# Patient Record
Sex: MALE | Race: BLACK OR AFRICAN AMERICAN | Employment: FULL TIME | URBAN - METROPOLITAN AREA
[De-identification: names, ages, dates, MRNs, and addresses within clinical notes are randomized per-mention and may not be internally consistent; named-entity substitution may affect disease eponyms.]

---

## 2021-04-06 ENCOUNTER — APPOINTMENT (OUTPATIENT)
Dept: CT IMAGING | Age: 21
End: 2021-04-06

## 2021-04-06 ENCOUNTER — HOSPITAL ENCOUNTER (EMERGENCY)
Age: 21
Discharge: HOME | End: 2021-04-06

## 2021-04-06 VITALS
WEIGHT: 155 LBS | TEMPERATURE: 98.6 F | SYSTOLIC BLOOD PRESSURE: 130 MMHG | RESPIRATION RATE: 16 BRPM | HEART RATE: 77 BPM | DIASTOLIC BLOOD PRESSURE: 79 MMHG | OXYGEN SATURATION: 97 %

## 2021-04-06 DIAGNOSIS — R10.10 UPPER ABDOMINAL PAIN: Primary | ICD-10-CM

## 2021-04-06 LAB
A/G RATIO: 1.1 (ref 1.1–2.2)
ALBUMIN SERPL-MCNC: 4.7 G/DL (ref 3.4–5)
ALP BLD-CCNC: 83 U/L (ref 40–129)
ALT SERPL-CCNC: 10 U/L (ref 10–40)
ANION GAP SERPL CALCULATED.3IONS-SCNC: 13 MMOL/L (ref 3–16)
AST SERPL-CCNC: 17 U/L (ref 15–37)
BASOPHILS ABSOLUTE: 0 K/UL (ref 0–0.2)
BASOPHILS RELATIVE PERCENT: 1 %
BILIRUB SERPL-MCNC: 1.3 MG/DL (ref 0–1)
BILIRUBIN URINE: NEGATIVE
BLOOD, URINE: NEGATIVE
BUN BLDV-MCNC: 7 MG/DL (ref 7–20)
CALCIUM SERPL-MCNC: 10.1 MG/DL (ref 8.3–10.6)
CHLORIDE BLD-SCNC: 102 MMOL/L (ref 99–110)
CLARITY: CLEAR
CO2: 24 MMOL/L (ref 21–32)
COLOR: YELLOW
CREAT SERPL-MCNC: 0.8 MG/DL (ref 0.9–1.3)
EOSINOPHILS ABSOLUTE: 0 K/UL (ref 0–0.6)
EOSINOPHILS RELATIVE PERCENT: 0 %
GFR AFRICAN AMERICAN: >60
GFR NON-AFRICAN AMERICAN: >60
GLOBULIN: 4.1 G/DL
GLUCOSE BLD-MCNC: 97 MG/DL (ref 70–99)
GLUCOSE URINE: NEGATIVE MG/DL
HCT VFR BLD CALC: 48.1 % (ref 40.5–52.5)
HEMOGLOBIN: 15.6 G/DL (ref 13.5–17.5)
KETONES, URINE: NEGATIVE MG/DL
LEUKOCYTE ESTERASE, URINE: NEGATIVE
LIPASE: 17 U/L (ref 13–60)
LYMPHOCYTES ABSOLUTE: 0.8 K/UL (ref 1–5.1)
LYMPHOCYTES RELATIVE PERCENT: 14.9 %
MCH RBC QN AUTO: 29.6 PG (ref 26–34)
MCHC RBC AUTO-ENTMCNC: 32.5 G/DL (ref 31–36)
MCV RBC AUTO: 91.1 FL (ref 80–100)
MICROSCOPIC EXAMINATION: NORMAL
MONOCYTES ABSOLUTE: 0.2 K/UL (ref 0–1.3)
MONOCYTES RELATIVE PERCENT: 4.8 %
NEUTROPHILS ABSOLUTE: 4 K/UL (ref 1.7–7.7)
NEUTROPHILS RELATIVE PERCENT: 79.3 %
NITRITE, URINE: NEGATIVE
PDW BLD-RTO: 13.1 % (ref 12.4–15.4)
PH UA: 7.5 (ref 5–8)
PLATELET # BLD: 177 K/UL (ref 135–450)
PMV BLD AUTO: 8.6 FL (ref 5–10.5)
POTASSIUM REFLEX MAGNESIUM: 3.8 MMOL/L (ref 3.5–5.1)
PROTEIN UA: NEGATIVE MG/DL
RBC # BLD: 5.27 M/UL (ref 4.2–5.9)
SODIUM BLD-SCNC: 139 MMOL/L (ref 136–145)
SPECIFIC GRAVITY UA: >1.03 (ref 1–1.03)
TOTAL PROTEIN: 8.8 G/DL (ref 6.4–8.2)
URINE REFLEX TO CULTURE: NORMAL
URINE TYPE: NORMAL
UROBILINOGEN, URINE: 1 E.U./DL
WBC # BLD: 5.1 K/UL (ref 4–11)

## 2021-04-06 PROCEDURE — 81003 URINALYSIS AUTO W/O SCOPE: CPT

## 2021-04-06 PROCEDURE — 99283 EMERGENCY DEPT VISIT LOW MDM: CPT

## 2021-04-06 PROCEDURE — 83690 ASSAY OF LIPASE: CPT

## 2021-04-06 PROCEDURE — 74177 CT ABD & PELVIS W/CONTRAST: CPT

## 2021-04-06 PROCEDURE — 6370000000 HC RX 637 (ALT 250 FOR IP): Performed by: PHYSICIAN ASSISTANT

## 2021-04-06 PROCEDURE — 36415 COLL VENOUS BLD VENIPUNCTURE: CPT

## 2021-04-06 PROCEDURE — 6360000004 HC RX CONTRAST MEDICATION: Performed by: PHYSICIAN ASSISTANT

## 2021-04-06 PROCEDURE — 85025 COMPLETE CBC W/AUTO DIFF WBC: CPT

## 2021-04-06 PROCEDURE — 80053 COMPREHEN METABOLIC PANEL: CPT

## 2021-04-06 RX ORDER — PANTOPRAZOLE SODIUM 20 MG/1
40 TABLET, DELAYED RELEASE ORAL DAILY
Qty: 30 TABLET | Refills: 0 | Status: ON HOLD | OUTPATIENT
Start: 2021-04-06 | End: 2021-04-09 | Stop reason: HOSPADM

## 2021-04-06 RX ORDER — ONDANSETRON 4 MG/1
8 TABLET, ORALLY DISINTEGRATING ORAL ONCE
Status: COMPLETED | OUTPATIENT
Start: 2021-04-06 | End: 2021-04-06

## 2021-04-06 RX ORDER — PANTOPRAZOLE SODIUM 40 MG/1
40 TABLET, DELAYED RELEASE ORAL ONCE
Status: COMPLETED | OUTPATIENT
Start: 2021-04-06 | End: 2021-04-06

## 2021-04-06 RX ORDER — ONDANSETRON 4 MG/1
4 TABLET, ORALLY DISINTEGRATING ORAL EVERY 8 HOURS PRN
Qty: 20 TABLET | Refills: 0 | Status: ON HOLD | OUTPATIENT
Start: 2021-04-06 | End: 2021-04-09 | Stop reason: HOSPADM

## 2021-04-06 RX ADMIN — IOPAMIDOL 75 ML: 755 INJECTION, SOLUTION INTRAVENOUS at 11:31

## 2021-04-06 RX ADMIN — PANTOPRAZOLE SODIUM 40 MG: 40 TABLET, DELAYED RELEASE ORAL at 14:41

## 2021-04-06 RX ADMIN — ALUMINUM HYDROXIDE, MAGNESIUM HYDROXIDE, AND SIMETHICONE: 200; 200; 20 SUSPENSION ORAL at 09:38

## 2021-04-06 RX ADMIN — ONDANSETRON 8 MG: 4 TABLET, ORALLY DISINTEGRATING ORAL at 09:38

## 2021-04-06 ASSESSMENT — ENCOUNTER SYMPTOMS
VOMITING: 0
BACK PAIN: 0
DIARRHEA: 0
ABDOMINAL PAIN: 1
RESPIRATORY NEGATIVE: 1
NAUSEA: 1
CHEST TIGHTNESS: 0
SHORTNESS OF BREATH: 0
COUGH: 0
COLOR CHANGE: 0
CONSTIPATION: 0

## 2021-04-06 ASSESSMENT — PAIN SCALES - GENERAL: PAINLEVEL_OUTOF10: 6

## 2021-04-06 NOTE — ED PROVIDER NOTES
[address] ENCOUNTER        Pt Name: Petra Dobbs  MRN: [id number]  [place] 2000  Date of evaluation: 4/6/2021  Provider: DOREEN Ferrer  PCP: No primary care provider on file. NANCY. I have evaluated this patient. My supervising physician was available for consultation. CHIEF COMPLAINT       Chief Complaint   Patient presents with    Abdominal Pain     Pt reports abdominal pain that started this morning, states it feels that it is stabbing in nature. HISTORY OF PRESENT ILLNESS   (Location, Timing/Onset, Context/Setting, Quality, Duration, Modifying Factors, Severity, Associated Signs and Symptoms)  Note limiting factors. Petra Dobbs is a 21 y.o. male with no significant past medical history who presents to the ED with complaint of abdominal pain. Woke up this morning with pain to his upper abdomen. Patient states pain is sharp in nature rated 6/10. Apparently had similar pain in the past but unsure what it was due to. States nausea but denies any vomiting. States decreased oral intake due to lack of appetite. Did not eat this morning. Pain worsened with eating and drinking. Patient denies any urinary symptoms or changes in bowel movements. Denies any surgeries to his abdomen. Denies fever chills. Denies chest pain or shortness of breath. Patient states he is not a smoker. Denies any significant history of gastritis/GERD. Denies any history of peptic ulcer disease. Patient denies taking any over-the-counter medication prior to arrival.  Became concerned and came to the ED for further evaluation and treatment. Nursing Notes were all reviewed and agreed with or any disagreements were addressed in the HPI.     REVIEW OF SYSTEMS    (2-9 systems for level 4, 10 or more for level 5)     Review of Systems   Constitutional: Negative for activity change, appetite change, chills, diaphoresis, fatigue discharge. Neck:      Musculoskeletal: Normal range of motion and neck supple. Cardiovascular:      Rate and Rhythm: Normal rate and regular rhythm. Pulses: Normal pulses. Heart sounds: Normal heart sounds. No murmur. No friction rub. No gallop. Pulmonary:      Effort: Pulmonary effort is normal. No respiratory distress. Breath sounds: Normal breath sounds. No stridor. No wheezing, rhonchi or rales. Chest:      Chest wall: No tenderness. Abdominal:      General: Abdomen is flat. Bowel sounds are normal. There is no distension. Palpations: Abdomen is soft. There is no mass. Tenderness: There is abdominal tenderness in the right upper quadrant, epigastric area, periumbilical area and left upper quadrant. There is guarding (voluntary). There is no right CVA tenderness, left CVA tenderness or rebound. Negative signs include Suarez's sign, Rovsing's sign and McBurney's sign. Hernia: No hernia is present. Musculoskeletal: Normal range of motion. Skin:     General: Skin is warm and dry. Coloration: Skin is not pale. Findings: No erythema. Neurological:      Mental Status: He is alert and oriented to person, place, and time.    Psychiatric:         Behavior: Behavior normal.         DIAGNOSTIC RESULTS   LABS:    Labs Reviewed   CBC WITH AUTO DIFFERENTIAL - Abnormal; Notable for the following components:       Result Value    Lymphocytes Absolute 0.8 (*)     All other components within normal limits    Narrative:     Performed at:  [facility] [address] [address] [employer]   Phone [phone]   COMPREHENSIVE METABOLIC PANEL W/ REFLEX TO MG FOR LOW K - Abnormal; Notable for the following components:    CREATININE 0.8 (*)     Total Protein 8.8 (*)     Total Bilirubin 1.3 (*)     All other components within normal limits    Narrative:     Performed at:  [facility] [address] Given 4/6/21 1131)           Patient is a 78-year-old male who presents to the ED implant of upper abdominal pain. Upper abdominal pain he states he woke up with this morning. Patient given GI cocktail with significant improvement of symptoms. Also given Protonix and Zofran. Urinalysis unremarkable. CBC showed normal white count, hemoglobin and platelets. CMP unremarkable. Lipase normal.  CT of the abdomen pelvis showed minimal pelvic free fluid which is nonspecific and may outpatient but no acute etiology identified. There is a normal appendix. Upon repeat examination patient has benign abdomen with improvement of symptoms. Believe patient from upper abdominal pain which could potential be due to gastritis versus GERD at this time. Will start on Protonix and Zofran for home. Patient and informed of the CT showing pelvic free fluid but given reassuring abdominal examination and reassuring work-up here in the ED otherwise believe be safely discharged home with close outpatient follow-up. Will refer to PCP. Given referral to doctors urgent care for 24-hour to 48-hour abdominal exam recheck. Return to ED if any worsening symptoms. Low suspicion for obstruction, peritonitis, perforation, pancreatitis, cholecystitis, appendicitis, diverticulitis, colitis, mesenteric ischemia, AAA, dissection, pyelonephritis, nephrolithiasis, testicular etiology, pyelonephritis, nephrolithiasis or other emergent etiology at this time. FINAL IMPRESSION      1.  Upper abdominal pain          DISPOSITION/PLAN   DISPOSITION Decision To Discharge 04/06/2021 02:58:36 PM      PATIENT REFERREDTO:  [facility]  [address] [place] [address]  [phone]  Go to   As needed, If symptoms worsen    [facility]) Pre-Services  [phone]          DISCHARGE MEDICATIONS:  Discharge Medication List as of 4/6/2021  3:00 PM      START taking these medications    Details   pantoprazole (PROTONIX) 20 MG tablet Take 2 tablets by mouth daily, Disp-30 tablet, R-0Print      ondansetron (ZOFRAN ODT) 4 MG disintegrating tablet Take 1 tablet by mouth every 8 hours as needed for Nausea, Disp-20 tablet, R-0Print             DISCONTINUED MEDICATIONS:  Discharge Medication List as of 4/6/2021  3:00 PM                 (Please note that portions of this note were completed with a voice recognition program.  Efforts were made to edit the dictations but occasionally words are mis-transcribed.)    DOREEN Kwan (electronically signed)         DOREEN Ruiz  04/06/21 4305

## 2021-04-07 ENCOUNTER — HOSPITAL ENCOUNTER (EMERGENCY)
Age: 21
Discharge: PSYCHIATRIC OR SUBSTANCE USE FACILITY | End: 2021-04-07
Attending: EMERGENCY MEDICINE

## 2021-04-07 ENCOUNTER — APPOINTMENT (OUTPATIENT)
Dept: GENERAL RADIOLOGY | Age: 21
End: 2021-04-07

## 2021-04-07 ENCOUNTER — APPOINTMENT (OUTPATIENT)
Dept: CT IMAGING | Age: 21
End: 2021-04-07

## 2021-04-07 ENCOUNTER — HOSPITAL ENCOUNTER (INPATIENT)
Age: 21
LOS: 2 days | Discharge: HOME | DRG: 885 | End: 2021-04-09
Attending: PSYCHIATRY & NEUROLOGY | Admitting: PSYCHIATRY & NEUROLOGY

## 2021-04-07 VITALS
OXYGEN SATURATION: 96 % | HEIGHT: 66 IN | SYSTOLIC BLOOD PRESSURE: 124 MMHG | RESPIRATION RATE: 22 BRPM | DIASTOLIC BLOOD PRESSURE: 50 MMHG | TEMPERATURE: 98.7 F | BODY MASS INDEX: 24.91 KG/M2 | HEART RATE: 77 BPM | WEIGHT: 155 LBS

## 2021-04-07 DIAGNOSIS — R44.3 HALLUCINATIONS: Primary | ICD-10-CM

## 2021-04-07 PROBLEM — F29 PSYCHOSIS (HCC): Status: ACTIVE | Noted: 2021-04-07

## 2021-04-07 LAB
A/G RATIO: 1.4 (ref 1.1–2.2)
ACETAMINOPHEN LEVEL: <5 UG/ML (ref 10–30)
ALBUMIN SERPL-MCNC: 4.7 G/DL (ref 3.4–5)
ALP BLD-CCNC: 74 U/L (ref 40–129)
ALT SERPL-CCNC: 10 U/L (ref 10–40)
AMPHETAMINE SCREEN, URINE: NORMAL
ANION GAP SERPL CALCULATED.3IONS-SCNC: 20 MMOL/L (ref 3–16)
AST SERPL-CCNC: 20 U/L (ref 15–37)
BARBITURATE SCREEN URINE: NORMAL
BASOPHILS ABSOLUTE: 0.1 K/UL (ref 0–0.2)
BASOPHILS RELATIVE PERCENT: 0.8 %
BENZODIAZEPINE SCREEN, URINE: NORMAL
BILIRUB SERPL-MCNC: 1.2 MG/DL (ref 0–1)
BILIRUBIN URINE: NEGATIVE
BLOOD, URINE: NEGATIVE
BUN BLDV-MCNC: 8 MG/DL (ref 7–20)
CALCIUM SERPL-MCNC: 9.6 MG/DL (ref 8.3–10.6)
CANNABINOID SCREEN URINE: NORMAL
CHLORIDE BLD-SCNC: 100 MMOL/L (ref 99–110)
CLARITY: ABNORMAL
CO2: 18 MMOL/L (ref 21–32)
COCAINE METABOLITE SCREEN URINE: NORMAL
COLOR: YELLOW
CREAT SERPL-MCNC: 1.1 MG/DL (ref 0.9–1.3)
EKG ATRIAL RATE: 79 BPM
EKG DIAGNOSIS: NORMAL
EKG P AXIS: 70 DEGREES
EKG P-R INTERVAL: 156 MS
EKG Q-T INTERVAL: 344 MS
EKG QRS DURATION: 88 MS
EKG QTC CALCULATION (BAZETT): 394 MS
EKG R AXIS: 49 DEGREES
EKG T AXIS: 45 DEGREES
EKG VENTRICULAR RATE: 79 BPM
EOSINOPHILS ABSOLUTE: 0 K/UL (ref 0–0.6)
EOSINOPHILS RELATIVE PERCENT: 0 %
EPITHELIAL CELLS, UA: 5 /HPF (ref 0–5)
ETHANOL: NORMAL MG/DL (ref 0–0.08)
GFR AFRICAN AMERICAN: >60
GFR NON-AFRICAN AMERICAN: >60
GLOBULIN: 3.4 G/DL
GLUCOSE BLD-MCNC: 140 MG/DL (ref 70–99)
GLUCOSE URINE: NEGATIVE MG/DL
HCT VFR BLD CALC: 44.9 % (ref 40.5–52.5)
HEMOGLOBIN: 14.6 G/DL (ref 13.5–17.5)
HYALINE CASTS: 9 /LPF (ref 0–8)
KETONES, URINE: ABNORMAL MG/DL
LEUKOCYTE ESTERASE, URINE: NEGATIVE
LYMPHOCYTES ABSOLUTE: 0.9 K/UL (ref 1–5.1)
LYMPHOCYTES RELATIVE PERCENT: 13.1 %
Lab: NORMAL
MAGNESIUM: 2 MG/DL (ref 1.8–2.4)
MCH RBC QN AUTO: 29.9 PG (ref 26–34)
MCHC RBC AUTO-ENTMCNC: 32.6 G/DL (ref 31–36)
MCV RBC AUTO: 91.9 FL (ref 80–100)
METHADONE SCREEN, URINE: NORMAL
MICROSCOPIC EXAMINATION: YES
MONOCYTES ABSOLUTE: 0.4 K/UL (ref 0–1.3)
MONOCYTES RELATIVE PERCENT: 5.7 %
NEUTROPHILS ABSOLUTE: 5.3 K/UL (ref 1.7–7.7)
NEUTROPHILS RELATIVE PERCENT: 80.4 %
NITRITE, URINE: NEGATIVE
OPIATE SCREEN URINE: NORMAL
OXYCODONE URINE: NORMAL
PDW BLD-RTO: 12.8 % (ref 12.4–15.4)
PH UA: 7
PH UA: 7 (ref 5–8)
PHENCYCLIDINE SCREEN URINE: NORMAL
PLATELET # BLD: 186 K/UL (ref 135–450)
PMV BLD AUTO: 8.5 FL (ref 5–10.5)
POTASSIUM REFLEX MAGNESIUM: 3.3 MMOL/L (ref 3.5–5.1)
PROPOXYPHENE SCREEN: NORMAL
PROTEIN UA: 30 MG/DL
RBC # BLD: 4.88 M/UL (ref 4.2–5.9)
RBC UA: 5 /HPF (ref 0–4)
SALICYLATE, SERUM: 0.6 MG/DL (ref 15–30)
SARS-COV-2, NAAT: NOT DETECTED
SODIUM BLD-SCNC: 138 MMOL/L (ref 136–145)
SPECIFIC GRAVITY UA: 1.02 (ref 1–1.03)
TOTAL PROTEIN: 8.1 G/DL (ref 6.4–8.2)
URINE TYPE: ABNORMAL
UROBILINOGEN, URINE: 1 E.U./DL
WBC # BLD: 6.6 K/UL (ref 4–11)
WBC UA: 10 /HPF (ref 0–5)

## 2021-04-07 PROCEDURE — 87635 SARS-COV-2 COVID-19 AMP PRB: CPT

## 2021-04-07 PROCEDURE — 36415 COLL VENOUS BLD VENIPUNCTURE: CPT

## 2021-04-07 PROCEDURE — 85025 COMPLETE CBC W/AUTO DIFF WBC: CPT

## 2021-04-07 PROCEDURE — 80053 COMPREHEN METABOLIC PANEL: CPT

## 2021-04-07 PROCEDURE — 71045 X-RAY EXAM CHEST 1 VIEW: CPT

## 2021-04-07 PROCEDURE — 6360000002 HC RX W HCPCS

## 2021-04-07 PROCEDURE — 81001 URINALYSIS AUTO W/SCOPE: CPT

## 2021-04-07 PROCEDURE — 93005 ELECTROCARDIOGRAM TRACING: CPT | Performed by: EMERGENCY MEDICINE

## 2021-04-07 PROCEDURE — 80143 DRUG ASSAY ACETAMINOPHEN: CPT

## 2021-04-07 PROCEDURE — 93010 ELECTROCARDIOGRAM REPORT: CPT | Performed by: INTERNAL MEDICINE

## 2021-04-07 PROCEDURE — 80307 DRUG TEST PRSMV CHEM ANLYZR: CPT

## 2021-04-07 PROCEDURE — 99285 EMERGENCY DEPT VISIT HI MDM: CPT

## 2021-04-07 PROCEDURE — 82077 ASSAY SPEC XCP UR&BREATH IA: CPT

## 2021-04-07 PROCEDURE — 80179 DRUG ASSAY SALICYLATE: CPT

## 2021-04-07 PROCEDURE — 83735 ASSAY OF MAGNESIUM: CPT

## 2021-04-07 PROCEDURE — 1240000000 HC EMOTIONAL WELLNESS R&B

## 2021-04-07 PROCEDURE — 2580000003 HC RX 258: Performed by: EMERGENCY MEDICINE

## 2021-04-07 PROCEDURE — 70450 CT HEAD/BRAIN W/O DYE: CPT

## 2021-04-07 RX ORDER — HYDROXYZINE PAMOATE 25 MG/1
50 CAPSULE ORAL EVERY 6 HOURS PRN
Status: DISCONTINUED | OUTPATIENT
Start: 2021-04-07 | End: 2021-04-08

## 2021-04-07 RX ORDER — MAGNESIUM HYDROXIDE/ALUMINUM HYDROXICE/SIMETHICONE 120; 1200; 1200 MG/30ML; MG/30ML; MG/30ML
30 SUSPENSION ORAL EVERY 6 HOURS PRN
Status: DISCONTINUED | OUTPATIENT
Start: 2021-04-07 | End: 2021-04-09 | Stop reason: HOSPADM

## 2021-04-07 RX ORDER — OLANZAPINE 5 MG/1
5 TABLET ORAL EVERY 4 HOURS PRN
Status: DISCONTINUED | OUTPATIENT
Start: 2021-04-07 | End: 2021-04-09 | Stop reason: HOSPADM

## 2021-04-07 RX ORDER — LORAZEPAM 2 MG/ML
INJECTION INTRAMUSCULAR
Status: COMPLETED
Start: 2021-04-07 | End: 2021-04-07

## 2021-04-07 RX ORDER — OLANZAPINE 10 MG/1
10 INJECTION, POWDER, LYOPHILIZED, FOR SOLUTION INTRAMUSCULAR 3 TIMES DAILY PRN
Status: DISCONTINUED | OUTPATIENT
Start: 2021-04-07 | End: 2021-04-08

## 2021-04-07 RX ORDER — HALOPERIDOL 5 MG/ML
INJECTION INTRAMUSCULAR
Status: COMPLETED
Start: 2021-04-07 | End: 2021-04-07

## 2021-04-07 RX ORDER — BENZTROPINE MESYLATE 1 MG/ML
2 INJECTION INTRAMUSCULAR; INTRAVENOUS 2 TIMES DAILY PRN
Status: DISCONTINUED | OUTPATIENT
Start: 2021-04-07 | End: 2021-04-09 | Stop reason: HOSPADM

## 2021-04-07 RX ORDER — PANTOPRAZOLE SODIUM 40 MG/1
40 TABLET, DELAYED RELEASE ORAL DAILY
Status: DISCONTINUED | OUTPATIENT
Start: 2021-04-08 | End: 2021-04-08

## 2021-04-07 RX ORDER — 0.9 % SODIUM CHLORIDE 0.9 %
1000 INTRAVENOUS SOLUTION INTRAVENOUS ONCE
Status: COMPLETED | OUTPATIENT
Start: 2021-04-07 | End: 2021-04-07

## 2021-04-07 RX ORDER — TRAZODONE HYDROCHLORIDE 50 MG/1
50 TABLET ORAL NIGHTLY PRN
Status: DISCONTINUED | OUTPATIENT
Start: 2021-04-07 | End: 2021-04-08

## 2021-04-07 RX ORDER — ACETAMINOPHEN 325 MG/1
650 TABLET ORAL EVERY 4 HOURS PRN
Status: DISCONTINUED | OUTPATIENT
Start: 2021-04-07 | End: 2021-04-08

## 2021-04-07 RX ADMIN — SODIUM CHLORIDE 1000 ML: 9 INJECTION, SOLUTION INTRAVENOUS at 13:11

## 2021-04-07 RX ADMIN — HALOPERIDOL LACTATE 5 MG: 5 INJECTION, SOLUTION INTRAMUSCULAR at 11:25

## 2021-04-07 RX ADMIN — LORAZEPAM 2 MG: 2 INJECTION INTRAMUSCULAR; INTRAVENOUS at 11:25

## 2021-04-07 NOTE — ED NOTES
Report called to Moiz Smalls RN at [facility], [facility]  04/07/21 8607

## 2021-04-07 NOTE — ED TRIAGE NOTES
Struggle in waiting room with pt   Security called, ED staff and security had to assist pt to the bed, pt was yelling \"no\" and kicking. Pt seemed scared and not aware of his surroundings.

## 2021-04-07 NOTE — ED NOTES
Pt sleeping at this time. Calm  SC at bedside  All belongings that were removed were given to parents.        Eva Lazar RN  04/07/21 8165

## 2021-04-07 NOTE — ED PROVIDER NOTES
reviewed. Constitutional: anxious, muttering to himself  HENT:      Head: Normocephalic      Ears: External ears normal.      Nose: Nose normal.     Mouth: Membrane mucosa moist   Eyes: No discharge. Neck: Supple. Trachea midline. Cardiovascular: Regular rate. Warm extremities  Pulmonary/Chest: Effort normal. No respiratory distress. Abdominal: Soft. No distension. Nontender  : Deferred  Rectal: Deferred   Musculoskeletal: Moves all extremities. No gross deformity. Neurological: Alert. Face symmetric. Moving all extremities with good strength. Extraocular movements intact. Pupils equal round and reactive to light  Skin: Warm and dry. Psychiatric: Normal mood and affect. Behavior is normal.    Procedures      EKG  The Ekg interpreted by me in the absence of a cardiologist shows. Normal sinus rhythm. No specific ST changes appreciated. HR 79  No prior EKG for comparison      Radiology  CT HEAD WO CONTRAST   Final Result   No acute intracranial abnormality.          XR CHEST PORTABLE   Final Result   No acute findings             Labs  Results for orders placed or performed during the hospital encounter of 04/07/21   COVID-19, Rapid   Result Value Ref Range    SARS-CoV-2, NAAT Not Detected Not Detected   CBC auto differential   Result Value Ref Range    WBC 6.6 4.0 - 11.0 K/uL    RBC 4.88 4.20 - 5.90 M/uL    Hemoglobin 14.6 13.5 - 17.5 g/dL    Hematocrit 44.9 40.5 - 52.5 %    MCV 91.9 80.0 - 100.0 fL    MCH 29.9 26.0 - 34.0 pg    MCHC 32.6 31.0 - 36.0 g/dL    RDW 12.8 12.4 - 15.4 %    Platelets 223 501 - 584 K/uL    MPV 8.5 5.0 - 10.5 fL    Neutrophils % 80.4 %    Lymphocytes % 13.1 %    Monocytes % 5.7 %    Eosinophils % 0.0 %    Basophils % 0.8 %    Neutrophils Absolute 5.3 1.7 - 7.7 K/uL    Lymphocytes Absolute 0.9 (L) 1.0 - 5.1 K/uL    Monocytes Absolute 0.4 0.0 - 1.3 K/uL    Eosinophils Absolute 0.0 0.0 - 0.6 K/uL    Basophils Absolute 0.1 0.0 - 0.2 K/uL   Comprehensive Metabolic Panel w/ Reflex to MG   Result Value Ref Range    Sodium 138 136 - 145 mmol/L    Potassium reflex Magnesium 3.3 (L) 3.5 - 5.1 mmol/L    Chloride 100 99 - 110 mmol/L    CO2 18 (L) 21 - 32 mmol/L    Anion Gap 20 (H) 3 - 16    Glucose 140 (H) 70 - 99 mg/dL    BUN 8 7 - 20 mg/dL    CREATININE 1.1 0.9 - 1.3 mg/dL    GFR Non-African American >60 >60    GFR African American >60 >60    Calcium 9.6 8.3 - 10.6 mg/dL    Total Protein 8.1 6.4 - 8.2 g/dL    Albumin 4.7 3.4 - 5.0 g/dL    Albumin/Globulin Ratio 1.4 1.1 - 2.2    Total Bilirubin 1.2 (H) 0.0 - 1.0 mg/dL    Alkaline Phosphatase 74 40 - 129 U/L    ALT 10 10 - 40 U/L    AST 20 15 - 37 U/L    Globulin 3.4 g/dL   Acetaminophen level   Result Value Ref Range    Acetaminophen Level <5 (L) 10 - 30 ug/mL   Salicylate   Result Value Ref Range    Salicylate, Serum 0.6 (L) 15.0 - 30.0 mg/dL   Urinalysis, reflex to microscopic   Result Value Ref Range    Color, UA YELLOW Straw/Yellow    Clarity, UA CLOUDY (A) Clear    Glucose, Ur Negative Negative mg/dL    Bilirubin Urine Negative Negative    Ketones, Urine TRACE (A) Negative mg/dL    Specific Gravity, UA 1.018 1.005 - 1.030    Blood, Urine Negative Negative    pH, UA 7.0 5.0 - 8.0    Protein, UA 30 (A) Negative mg/dL    Urobilinogen, Urine 1.0 <2.0 E.U./dL    Nitrite, Urine Negative Negative    Leukocyte Esterase, Urine Negative Negative    Microscopic Examination YES     Urine Type NotGiven    Drug screen multi urine   Result Value Ref Range    Amphetamine Screen, Urine Neg Negative <1000ng/mL    Barbiturate Screen, Ur Neg Negative <200 ng/mL    Benzodiazepine Screen, Urine Neg Negative <200 ng/mL    Cannabinoid Scrn, Ur Neg Negative <50 ng/mL    Cocaine Metabolite Screen, Urine Neg Negative <300 ng/mL    Opiate Scrn, Ur Neg Negative <300 ng/mL    PCP Screen, Urine Neg Negative <25 ng/mL    Methadone Screen, Urine Neg Negative <300 ng/mL    Propoxyphene Scrn, Ur Neg Negative <300 ng/mL    Oxycodone Urine Neg Negative <100 ng/ml    pH, UA 7.0     Drug Screen Comment: see below    Ethanol   Result Value Ref Range    Ethanol Lvl None Detected mg/dL   Microscopic Urinalysis   Result Value Ref Range    Hyaline Casts, UA 9 (H) 0 - 8 /LPF    WBC, UA 10 (H) 0 - 5 /HPF    RBC, UA 5 (H) 0 - 4 /HPF    Epithelial Cells, UA 5 0 - 5 /HPF   Magnesium   Result Value Ref Range    Magnesium 2.00 1.80 - 2.40 mg/dL   EKG 12 Lead   Result Value Ref Range    Ventricular Rate 79 BPM    Atrial Rate 79 BPM    P-R Interval 156 ms    QRS Duration 88 ms    Q-T Interval 344 ms    QTc Calculation (Bazett) 394 ms    P Axis 70 degrees    R Axis 49 degrees    T Axis 45 degrees    Diagnosis       Sinus rhythm with marked sinus arrhythmiaModerate voltage criteria for LVH, may be normal variantBorderline ECG       Screenings           MDM and ED Course  This is a 21 y.o. male who presents to the ED for hallucinations, agitation. Was yelling into the waiting room and kicking. Patient brought to his room and was physically restrained. His parents were at bedside while this happened. Soft restraints were applied for patient's protection as well as protection of staff. He was given Haldol and Ativan for agitation with improvement. I was able to speak at length with the patient's father. He has been hallucinating. No prior diagnosis of schizophrenia. Yesterday, he did come to the emergency room and was reportedly very quiet per nursing staff who had seen him before. Per father, abdominal discomfort seems to have gone away. Obtaining head CT to evaluate for bleed. Lab work shows no acute abnormalities that could explain patient's hallucinations. Has not been having any more abdominal pain. Labs show no abnormalities consistent with abdominal pathology. Not having any belly tenderness    -----------    Patient reassessed. He is resting comfortably. Restraints being taken off. Patient's brother at bedside was updated on plan of care for psychiatric transfer.  Patient medically clear at this time. The total critical care time spent while evaluating and treating this patient was at least 31 minutes. This excludes time spent doing separately billable procedures. This includes time at the bedside, data interpretation, medication management, obtaining critical history from collateral sources if the patient is unable to provide it directly, and physician consultation. Specifics of interventions taken and potentially life-threatening diagnostic considerations are listed above in the medical decision making. [unfilled]    Final Impression  1. Hallucinations        Blood pressure (!) 99/53, pulse 81, temperature 98.7 °F (37.1 °C), temperature source Oral, resp. rate 21, height 5' 6\" (1.676 m), weight 155 lb (70.3 kg), SpO2 98 %. Disposition:  DISPOSITION Decision To Transfer 04/07/2021 01:43:02 PM      Patient Referrals:  No follow-up provider specified. Discharge Medications:  New Prescriptions    No medications on file       Discontinued Medications:  Discontinued Medications    No medications on file       This chart was generated using the [address] [place] [address] dictation system. I created this record but it may contain dictation errors given the limitations of this technology.         Leonid Call MD  04/07/21 6871

## 2021-04-07 NOTE — ED NOTES
Pt sleeping, calm and cooperative. Family updated on plan of care.   Waiting on inpatient bed from ginny Palencia RN  04/07/21 7946

## 2021-04-07 NOTE — ED NOTES
Pt awake, calm and cooperative at this time. NSR on monitor, VSS, restraints continue to be off all four limbs.   Water provided for pt,  at bedside, will continue to monitor     Brianna Phelps RN  04/07/21 9943

## 2021-04-07 NOTE — ED NOTES
Pt asleep, family at bedside,  at bedside, cardiac monitor in place, IV fluids infusing, VSS, will continue to monitor     Katy Ruiz RN  04/07/21 7127

## 2021-04-08 PROBLEM — F20.81 SCHIZOPHRENIFORM DISORDER (HCC): Status: ACTIVE | Noted: 2021-04-07

## 2021-04-08 LAB
ANION GAP SERPL CALCULATED.3IONS-SCNC: 12 MMOL/L (ref 3–16)
BUN BLDV-MCNC: 9 MG/DL (ref 7–20)
CALCIUM SERPL-MCNC: 9.7 MG/DL (ref 8.3–10.6)
CHLORIDE BLD-SCNC: 106 MMOL/L (ref 99–110)
CHOLESTEROL, TOTAL: 173 MG/DL (ref 0–199)
CO2: 25 MMOL/L (ref 21–32)
CREAT SERPL-MCNC: 0.8 MG/DL (ref 0.9–1.3)
GFR AFRICAN AMERICAN: >60
GFR NON-AFRICAN AMERICAN: >60
GLUCOSE BLD-MCNC: 80 MG/DL (ref 70–99)
HDLC SERPL-MCNC: 52 MG/DL (ref 40–60)
LDL CHOLESTEROL CALCULATED: 112 MG/DL
POTASSIUM SERPL-SCNC: 4.2 MMOL/L (ref 3.5–5.1)
SODIUM BLD-SCNC: 143 MMOL/L (ref 136–145)
TRIGL SERPL-MCNC: 47 MG/DL (ref 0–150)
VLDLC SERPL CALC-MCNC: 9 MG/DL

## 2021-04-08 PROCEDURE — 6370000000 HC RX 637 (ALT 250 FOR IP): Performed by: PSYCHIATRY & NEUROLOGY

## 2021-04-08 PROCEDURE — 80061 LIPID PANEL: CPT

## 2021-04-08 PROCEDURE — 1240000000 HC EMOTIONAL WELLNESS R&B

## 2021-04-08 PROCEDURE — 99222 1ST HOSP IP/OBS MODERATE 55: CPT | Performed by: PHYSICIAN ASSISTANT

## 2021-04-08 PROCEDURE — 80048 BASIC METABOLIC PNL TOTAL CA: CPT

## 2021-04-08 PROCEDURE — 36415 COLL VENOUS BLD VENIPUNCTURE: CPT

## 2021-04-08 PROCEDURE — 83036 HEMOGLOBIN GLYCOSYLATED A1C: CPT

## 2021-04-08 RX ORDER — RISPERIDONE 0.25 MG/1
0.5 TABLET, FILM COATED ORAL 2 TIMES DAILY
Status: DISCONTINUED | OUTPATIENT
Start: 2021-04-08 | End: 2021-04-09 | Stop reason: HOSPADM

## 2021-04-08 RX ORDER — LORAZEPAM 1 MG/1
1 TABLET ORAL EVERY 8 HOURS PRN
Status: DISCONTINUED | OUTPATIENT
Start: 2021-04-08 | End: 2021-04-09 | Stop reason: HOSPADM

## 2021-04-08 RX ORDER — LORAZEPAM 1 MG/1
1 TABLET ORAL ONCE
Status: COMPLETED | OUTPATIENT
Start: 2021-04-08 | End: 2021-04-08

## 2021-04-08 RX ORDER — ACETAMINOPHEN 325 MG/1
650 TABLET ORAL EVERY 4 HOURS PRN
Status: DISCONTINUED | OUTPATIENT
Start: 2021-04-08 | End: 2021-04-09 | Stop reason: HOSPADM

## 2021-04-08 RX ORDER — PANTOPRAZOLE SODIUM 40 MG/1
40 TABLET, DELAYED RELEASE ORAL
Status: DISCONTINUED | OUTPATIENT
Start: 2021-04-08 | End: 2021-04-09 | Stop reason: HOSPADM

## 2021-04-08 RX ADMIN — LORAZEPAM 1 MG: 1 TABLET ORAL at 14:32

## 2021-04-08 RX ADMIN — PANTOPRAZOLE SODIUM 40 MG: 40 TABLET, DELAYED RELEASE ORAL at 06:44

## 2021-04-08 RX ADMIN — RISPERIDONE 0.5 MG: 0.25 TABLET ORAL at 20:20

## 2021-04-08 RX ADMIN — RISPERIDONE 0.5 MG: 0.25 TABLET ORAL at 14:32

## 2021-04-08 ASSESSMENT — LIFESTYLE VARIABLES: HISTORY_ALCOHOL_USE: NO

## 2021-04-08 NOTE — FLOWSHEET NOTE
04/08/21 0859   Psychiatric History   Contact information no hx   Are there any medication issues? Yes   Support System   Support system Primary support persons   Types of Support System Mother;Father;Brother   Problems in support system Paranoia   Current Living Situation   Home Living Adequate   Living information Lives with others   Problems with living situation  No   Lack of basic needs No   SSDI/SSI none   Other government assistance none   Problems with environment none   Current abuse issues none   Supervised setting None   Relationship problems No   Medical and Self-Care Issues   Relevant medical problems epigastric pain   Relevant self-care issues none reported   Barriers to treatment No   Family Constellation   Spouse/partner-name/age none   Children-names/ages none   Parents Mahamed Lawton [phone]   Comment none reported   Childhood   Raised by Biological mother;Biological father   Relevant family history none reported   History of abuse No   Legal History   Legal history No   Juvenile legal history No    Abuse Assessment   Physical Abuse Denies   Verbal Abuse Denies   Emotional abuse Denies   Financial Abuse Denies   Sexual abuse Denies   Elder abuse No   Substance Use   Use of substances  No   Education   Education HS graduate -GED   Work History   Currently employed Yes   Leisure/Activity   Past interests would not say   Present interests would not say   Current daily activity would not say   Social with friends/family No   Cultural and Spiritual   Spiritual concerns No   Cultural concerns No   Completed psychosocial assessment. Patient is difficult to interview as he refuses to answer most questions. Poverty of thought present. AVH endorsed while in the ED. Oriented x1. S/H ideations unable to be evaluated as patient declines to answer.

## 2021-04-08 NOTE — BH NOTE
[facility]  Treatment Team Note  Day 1    Review Date & Time: 0900  4/8/2021    Patient was not in treatment team      Status EXAM:   Status and Exam  Normal: No  Facial Expression: Flat  Affect: Blunt  Level of Consciousness: Alert  Mood:Normal: No  Mood: Anxious, Helpless  Motor Activity:Normal: No  Motor Activity: Decreased  Interview Behavior: Uncooperative/Withdrawn  Preception: [place] to Person  Attention:Normal: No  Attention: Unable to Concentrate  Thought Processes: Blocking  Thought Content:Normal: No  Thought Content: Poverty of Content  Hallucinations: Auditory (Comment), Visual (Comment)(so many voices, seeing shadows)  Delusions: No  Memory:Normal: No  Memory: Poor Recent, Poor Remote  Insight and Judgment: No  Insight and Judgment: Poor Judgment, Poor Insight  Present Suicidal Ideation: No  Present Homicidal Ideation: No      Suicide Risk CSSR-S:  1) Within the past month, have you wished you were dead or wished you could go to sleep and not wake up? : (unable to assess)  2) Have you actually had any thoughts of killing yourself? : (unable to assess)  6) Have you ever done anything, started to do anything, or prepared to do anything to end your life?: (unable to assess)      PLAN/TREATMENT RECOMMENDATIONS UPDATE: Patient will take medication as prescribed, eat 75% of meals, attend groups, participate in milieu activities, participate in treatment team and care planning for discharge and follow up.           Digna Torres RN

## 2021-04-08 NOTE — H&P
Psychiatry History and Physical     Admission Date:    4/7/2021    Identification: domiciled, never-, partially employed 24yo without psychiatric history who was brought to [facility] ED by his father with odd - potentially psychotic - behavior. SOI:  ED notes. Patient. CC: \"I can't sleep\"    HPI:   Per the ED notes:  Hallucinations (Pt brought in per parents, immediately upon arrival pt tried to leave, he was incontinent of urine, pt was seen here yesterday for epigastric pain. Father states went he got home from work this am, his older son states that this pt has been awake all night, this pt reports seeing objects that were not there to his dad, so dad brought him in for evaluation. .. Stated that he was seeing shadows and hearing voices talking about death. Patient not answering any of my questions. Father states that yesterday, he had been having abdominal discomfort however this resolved. .. Was yelling into the waiting room and kicking. Patient brought to his room and was physically restrained. His parents were at bedside while this happened. Soft restraints were applied for patient's protection as well as protection of staff. He was given Haldol and Ativan for agitation with improvement. .. When I met with the patient he was soft spoken and seemed fearful but was able to provide some history. He said his father brought him to the ED because he \"can't talk to people, and can't walk. \" He endorsed hearing voices; he hears multiple, unfamiliar, men and women's voices muttering in both of his ears. He can't make out what they are saying. He did not endorse visual hallucinations. When asked of he had any other concerns, he talked about back and right lower quadrant pain, and having a hard time walking. He then got up to demonstrate by stumbling around the room. He got on the floor and writhed around. He did not endorse or voice symptoms of depression or of domi.  He endorsed soft. Poverty  Mood:  \"ok\"  Affect:  FLAT  Thought processes:  Disorganized? Thought Content: no SI, no HI, paranoid? Perceptions: +AH  Attention: impaired   Abstraction: impaired  Cognition: Average JENNIFER, Alert and oriented to person, place, time, and situation, recall intact  Insight: impaired  Judgment: impaired    LAB: Reviewed all labs obtained during admission to date. He had a head and abdominal CT in the ER - both negative. Formulation: domiciled, never-, partially employed 24yo without psychiatric history who was brought to [facility] ED by his father with odd - potentially psychotic - behavior. Dx:   Primary Psychiatric (DSM V) Diagnosis: schizophreniform disorder  Secondary Psychiatric (DSM V) Diagnoses: none  Chemical Dependency Diagnoses: none    Plan:    1. Admit to adult inpatient psychiatry for further evaluation, stabilization, and treatment. 2. Start risperidone 0.5mg BID. Order q15min checks for safety, programming, and prn medication for anxiety, agitation, and insomnia     3. Internal medicine consult for admission. 4. Further collateral information for diagnostic clarification and care coordinaton. 5. ELOS 8-10 days. Voluntary. Spent > 70 minutes face to face with patient of which >50% was spent counseling and providing education regarding diagnosis, treatment options, and prognosis.     Brigido Bain MD  Staff Psychiatrist

## 2021-04-08 NOTE — PROGRESS NOTES
Occupational Therapy      Received OT orders. Hold OT eval for Psychiatrist note.     Darnell Schmidt, OTR/L  #[id number]

## 2021-04-08 NOTE — PLAN OF CARE
Pt quiet soft spoken very hard to hear and understand. Pt isolative to self, but out ambulates thru unit and then returns to his room, pt nonsocial eating in his room and setting in back of dayroom when he is out of his room. Pt denies SI,HI, but continues to endorse AVH, \"so many voices I don't know what they say\", \"the shadows are there sometimes\". Pt contracts for safety states he feels safe here.

## 2021-04-08 NOTE — PROGRESS NOTES
Patient is awake. He is cooperative. He denied SI/HI, and denies A/V hallucinations. He is encouraged to get sleep. He asked to leave the lights on. He denied any pain, but was educated on the use of pain scale. He denied any needs. Will continue to monitor patient.

## 2021-04-08 NOTE — H&P
Negative for anxiety    PHYSICAL EXAM:    BP (!) 115/58   Pulse 83   Temp 98.7 °F (37.1 °C) (Oral)   Resp 16   Ht 5' 6\" (1.676 m)   Wt 155 lb (70.3 kg)   SpO2 99%   BMI 25.02 kg/m²   Gen: No distress. Alert. Young AA male, very soft spoken  Eyes: PERRL. No sclera icterus. No conjunctival injection. ENT: No discharge. Pharynx clear. Neck:  Trachea midline. Resp: No accessory muscle use. No crackles. No wheezes. No rhonchi. CV: Regular rate. Regular rhythm. No murmur. No rub. No edema. GI: Soft, Non-tender. Non-distended. Normal bowel sounds. Skin: Warm and dry. No rash on exposed extremities. M/S: No cyanosis. No clubbing. Neuro: Awake. Grossly nonfocal, CN II-XII intact    Psych: Defer to psychiatry.     CBC:   Recent Labs     04/06/21  0940 04/07/21  1145   WBC 5.1 6.6   HGB 15.6 14.6   HCT 48.1 44.9   MCV 91.1 91.9    186     BMP:   Recent Labs     04/06/21  0940 04/07/21  1145    138   K 3.8 3.3*    100   CO2 24 18*   BUN 7 8   CREATININE 0.8* 1.1     LIVER PROFILE:   Recent Labs     04/06/21  0940 04/07/21  1145   AST 17 20   ALT 10 10   LIPASE 17.0  --    BILITOT 1.3* 1.2*   ALKPHOS 83 74     UA:  Recent Labs     04/07/21  1204   COLORU YELLOW   PHUR 7.0  7.0   WBCUA 10*   RBCUA 5*   CLARITYU CLOUDY*   SPECGRAV 1.018   LEUKOCYTESUR Negative   UROBILINOGEN 1.0   BILIRUBINUR Negative   BLOODU Negative   GLUCOSEU Negative     U/A:    Lab Results   Component Value Date    COLORU YELLOW 04/07/2021    WBCUA 10 04/07/2021    RBCUA 5 04/07/2021    CLARITYU CLOUDY 04/07/2021    SPECGRAV 1.018 04/07/2021    LEUKOCYTESUR Negative 04/07/2021    BLOODU Negative 04/07/2021    GLUCOSEU Negative 04/07/2021     Pertinent previous results reviewed     EKG 4/7/2021  Sinus rhythm with marked sinus arrhythmia rate of 79  ST elevation, consider early repolarization, pericarditis, or injury  Confirmed by Shakira Espinoza MD, Jason Hayes ([id number]) on 4/7/2021 5:41:16 PM    CT Head 4/7/2021  No acute intracranial abnormality. CXR 4/7/2021  No acute findings    ASSESSMENT/PLAN:    Acute Psychosis  - cont mgmt per [facility]    Hypokalemia  - 3.3  - no replacement given in ED  - will repeat BMP today    AGMA  - etiology - unclear  - encourage PO fluids and repeat BMP today    GERD  - cont Protonix    Pt has no medical complaints at this time. Pt was informed that they may have [facility] contact us should any medical concerns arise during this admission.     Lula Chavez PA-C 1:27 PM 4/8/2021

## 2021-04-08 NOTE — PROGRESS NOTES
Pt arrived to floor he is mute with no s/s of distress. Jatin. pt family in ED with concerns. Spoke with patient told patient family was on phone and patient nod yes we can speak to them. family states patient does not speak good english. They want to stay but visiting hours explained. State patient is from [place] and speaks \"[place]\" pt talks to family shortly and tells them he is hungry. Snacks provided.

## 2021-04-08 NOTE — PROGRESS NOTES
Brother updated at this time. Upset he was informed wrong related visiting hours. Informed him hours were 2-4p but must choose first or second hour to visit. Pt awake and agreed to talk to brother. Brother of pt reports that all is well he has no so concerns. Pt back to room. No needs expressed.

## 2021-04-08 NOTE — FLOWSHEET NOTE
04/08/21 1501   Activities of Daily Living   Patient Requires assistance with daily self-care activities? No   Leisure Activity 1   3 Favorite Leisure Activities playing soccer   Frequency weekly   Last time this month   Leisure Activity 2   [address]  watching soccer on tv   Frequency  weekly   Last time  can't remember   Leisure Activity 3   [address]  watching comedy shows on tv   Frequency  > 2 hours/day   Last time  can't remember   Social   Patient reports spending the majority of their free time alone   Patient verbalizes a preference for spending free time alone   Patients perception of support system healthy/strong   Patients perception of barriers to socializing with others include(s) lack of motivation/interest;lack of comfort   Social Details Pt reports spending majority of time alone. Beliefs & Coping   Has difficulty dealing with feelings   Yes   Internalizes feelings/Keeps feelings in No   Externalizes feelings through aggressiveness or poor temper control  No   Feels uncomfortable around others  Yes   Has difficulty talking to others  Yes   Depends on others for direction or decisions No   Difficulty dealing with anger of others  No   Difficulty dealing with own anger  No   Difficulty managing stress No   Frequently has difficulty with relationships  No   Has recently perceived/experienced loss, disappointment, humiliation or failure  NO   General perception about self likes self   Attitude about abilities more successful than not   Locus of Control  most of the time   Belief about recovery Recovery is possible   Patient Identified Strengths  faithful, athletic, kind   Patient Identified Limitations  pt unable to id   Perception of most stressful event prior to hospitalization pt unable to id   Perception of changes needed pt unable to id   Strengths and Limitations   Strengths Independent in basic self-care activities; Positive support network   Limitations

## 2021-04-08 NOTE — PLAN OF CARE
Problem: Non-Violent Restraints  Goal: Removal from restraints as soon as assessed to be safe  Outcome: Completed  Goal: No harm/injury to patient while restraints in use  Outcome: Completed  Goal: Patient's dignity will be maintained  Outcome: Completed   Patient arrived to the [facility] without restraints. He was cooperative with care, denied any complaints.

## 2021-04-09 VITALS
RESPIRATION RATE: 18 BRPM | OXYGEN SATURATION: 98 % | HEIGHT: 66 IN | WEIGHT: 155 LBS | DIASTOLIC BLOOD PRESSURE: 66 MMHG | BODY MASS INDEX: 24.91 KG/M2 | TEMPERATURE: 98.4 F | HEART RATE: 63 BPM | SYSTOLIC BLOOD PRESSURE: 105 MMHG

## 2021-04-09 LAB
ANION GAP SERPL CALCULATED.3IONS-SCNC: 9 MMOL/L (ref 3–16)
BUN BLDV-MCNC: 6 MG/DL (ref 7–20)
CALCIUM SERPL-MCNC: 9.6 MG/DL (ref 8.3–10.6)
CHLORIDE BLD-SCNC: 102 MMOL/L (ref 99–110)
CO2: 28 MMOL/L (ref 21–32)
CREAT SERPL-MCNC: 0.8 MG/DL (ref 0.9–1.3)
ESTIMATED AVERAGE GLUCOSE: 82.5 MG/DL
GFR AFRICAN AMERICAN: >60
GFR NON-AFRICAN AMERICAN: >60
GLUCOSE BLD-MCNC: 83 MG/DL (ref 70–99)
HBA1C MFR BLD: 4.5 %
POTASSIUM SERPL-SCNC: 3.9 MMOL/L (ref 3.5–5.1)
SODIUM BLD-SCNC: 139 MMOL/L (ref 136–145)

## 2021-04-09 PROCEDURE — 80048 BASIC METABOLIC PNL TOTAL CA: CPT

## 2021-04-09 PROCEDURE — 36415 COLL VENOUS BLD VENIPUNCTURE: CPT

## 2021-04-09 PROCEDURE — 90833 PSYTX W PT W E/M 30 MIN: CPT | Performed by: PSYCHIATRY & NEUROLOGY

## 2021-04-09 PROCEDURE — 6370000000 HC RX 637 (ALT 250 FOR IP): Performed by: PSYCHIATRY & NEUROLOGY

## 2021-04-09 PROCEDURE — 83036 HEMOGLOBIN GLYCOSYLATED A1C: CPT

## 2021-04-09 PROCEDURE — 5130000000 HC BRIDGE APPOINTMENT

## 2021-04-09 PROCEDURE — 99239 HOSP IP/OBS DSCHRG MGMT >30: CPT | Performed by: PSYCHIATRY & NEUROLOGY

## 2021-04-09 RX ADMIN — PANTOPRAZOLE SODIUM 40 MG: 40 TABLET, DELAYED RELEASE ORAL at 06:42

## 2021-04-09 RX ADMIN — RISPERIDONE 0.5 MG: 0.25 TABLET ORAL at 09:02

## 2021-04-09 NOTE — GROUP NOTE
Group Therapy Note    Date: 4/8/2021    Group Start Time: 0830  Group End Time: 0900  Group Topic: Wrap-Up    Yoana Rhodes 40        Group Therapy Note    Attendees: 11       Patient's Goal: Pt to engage in group discussion. Talked about the rules of the unit and answered questions and concerns. Talked about goal of the day and what they did to achieve it. Notes: Pt was able to participate in group. Pt was able to engage and talk about his goal of \"stay out of my room\". Pt was able to get out of his room and attend groups. Pt needs prompting to talk but was appropriate. Status After Intervention:  Improved    Participation Level:  Active Listener    Participation Quality: Appropriate      Speech:  normal      Thought Process/Content: Logical  Linear      Affective Functioning: Congruent      Mood: anxious      Level of consciousness:  Alert      Response to Learning: Able to verbalize current knowledge/experience and Capable of insight      Endings: None Reported    Modes of Intervention: Education      Discipline Responsible: /Counselor      Signature:  Ananda Carter

## 2021-04-09 NOTE — BH NOTE
Patient refused to sign discharge paperwork. He was provided with a copy of his AVS. Escorted off the unit with his family.

## 2021-04-09 NOTE — PLAN OF CARE
Problem: Altered Mood, Deterioration in Function:  Goal: Ability to perform activities of daily living will improve  Description: Ability to perform activities of daily living will improve  4/8/2021 1319 by Priya Garcias LPN  Outcome: Ongoing  Goal: Able to verbalize reality based thinking  Description: Able to verbalize reality based thinking  4/8/2021 2048 by Matthew Steel RN  Outcome: Ongoing  4/8/2021 1319 by Priya Garcias LPN  Outcome: Ongoing  Goal: Skin appearance normal  Description: Skin appearance normal  4/8/2021 1319 by Priya Garcias LPN  Outcome: Ongoing  Goal: Maintenance of adequate nutrition will improve  Description: Maintenance of adequate nutrition will improve  4/8/2021 1319 by Priya Garcias LPN  Outcome: Ongoing  Goal: Ability to tolerate increased activity will improve  Description: Ability to tolerate increased activity will improve  4/8/2021 1319 by Priya Garcias LPN  Outcome: Ongoing  Goal: Participates in care planning  Description: Participates in care planning  4/8/2021 2048 by Matthew Steel RN  Outcome: Ongoing  4/8/2021 1319 by Priya Garcias LPN  Outcome: Ongoing  Goal: Patient specific goal  Description: Patient specific goal  4/8/2021 2048 by Matthew Steel RN  Outcome: Ongoing  4/8/2021 1319 by Priya Garcias LPN  Outcome: Ongoing     Problem: Altered Mood, Psychotic Behavior:  Goal: Able to demonstrate trust by eating, participating in treatment and following staff's direction  Description: Able to demonstrate trust by eating, participating in treatment and following staff's direction  4/8/2021 2048 by Matthew Steel RN  Outcome: Ongoing  4/8/2021 1319 by Priya Garcias LPN  Outcome: Ongoing  Goal: Able to verbalize decrease in frequency and intensity of hallucinations  Description: Able to verbalize decrease in frequency and intensity of hallucinations  4/8/2021 2048 by Matthew Steel RN  Outcome: Ongoing  4/8/2021 1319 by Priya Garcias LPN  Outcome: Ongoing  Goal: Able to verbalize reality based thinking  Description: Able to verbalize reality based thinking  4/8/2021 2048 by Edgar Pino RN  Outcome: Ongoing  4/8/2021 1319 by Kendra Marks LPN  Outcome: Ongoing  Goal: Ability to achieve adequate nutritional intake will improve  Description: Ability to achieve adequate nutritional intake will improve  4/8/2021 1319 by Kendra Marks LPN  Outcome: Ongoing  Goal: Ability to interact with others will improve  Description: Ability to interact with others will improve  4/8/2021 1319 by Kendra Marks LPN  Outcome: Ongoing  Goal: Compliance with prescribed medication regimen will improve  Description: Compliance with prescribed medication regimen will improve  4/8/2021 1319 by Kendra Marks LPN  Outcome: Ongoing  Goal: Patient specific goal  Description: Patient specific goal  4/8/2021 1319 by Kendra Marks LPN  Outcome: Ongoing     Problem: Altered Mood, Psychotic Behavior:  Goal: Absence of self-harm  Description: Absence of self-harm  4/8/2021 2048 by Edgar Pino RN  Outcome: Met This Shift  4/8/2021 1319 by Kendra Marks LPN  Outcome: Ongoing      Patient denies SI/HI and A/V hallucinations this shift, and agreed to inform of any changes. He was mostly isolated to his room this shift, out for water and snacks. He is medication compliant. He attended evening group.

## 2021-04-10 LAB
ESTIMATED AVERAGE GLUCOSE: 82.5 MG/DL
HBA1C MFR BLD: 4.5 %

## 2021-05-13 NOTE — DISCHARGE SUMMARY
hearing voices; he hears multiple, unfamiliar, men and women's voices muttering in both of his ears. He can't make out what they are saying.      He did not endorse visual hallucinations.     When asked of he had any other concerns, he talked about back and right lower quadrant pain, and having a hard time walking. He then got up to demonstrate by stumbling around the room. He got on the floor and writhed around.     He did not endorse or voice symptoms of depression or of domi. He endorsed feeling safe here. He was also agreeable to trying medication.     When walking back to his room, he contorted his upper body to the right, and shuffled. It was bizarre. He didn't look to be in pain.       He had a head and abdominal CT in the ER - both negative.      Past Psychiatric History:    Previous Diagnoses: none  PreviousHosp: none  OutpatientTx: none  Med Trials: none  Suicidality: none  History of violence: none. Some agitation in the ED before admission.     Past Medical History:  No chronic conditions, TBIs, seizures, or major surgeries.     Home Medications:  Home Medications           Prior to Admission medications    Medication Sig Start Date End Date Taking? Authorizing Provider   pantoprazole (PROTONIX) 20 MG tablet Take 2 tablets by mouth daily 4/6/21     DOREEN Fortune   ondansetron (ZOFRAN ODT) 4 MG disintegrating tablet Take 1 tablet by mouth every 8 hours as needed for Nausea 4/6/21     DOREEN Fortune         Chemical Dependency History:   Tobacco: none  Alcohol: none  Illicit: none     Family Mental Health Hx:    None  No suicides.      Social Hx:   [place] Says growing up there was \"fortunatly good. \"  Parents . Has three siblings. No HS  Lives with father, mother, and 3 siblings.  Works part-time at [employer].     ROS:  \"I feel pain in my heart, like it's going to stop\"  \"I can't walk\"      PE on admission:    BP (!) 118/56   Pulse 89   Temp 97.3 °F (36.3 °C) (Temporal)   Resp 16   Ht 5' 6\" (1.676 m)   Wt 155 lb (70.3 kg)   SpO2 96%   BMI 25.02 kg/m²        Motor / Gait: normal gait. Some odd movements. AIMS: 0     Mental Status Examination on admission:    Appearance: in gown, fair hygiene, fearful   Behavior/Attitude toward examiner:  poor eye contact  Speech:  Very soft. Poverty  Mood:  \"ok\"  Affect:  FLAT  Thought processes:  Disorganized? Thought Content: no SI, no HI, paranoid? Perceptions: +AH  Attention: impaired   Abstraction: impaired  Cognition: Average JENNIFER, Alert and oriented to person, place, time, and situation, recall intact  Insight: impaired  Judgment: impaired     LAB: Reviewed all labs obtained during admission to date. He had a head and abdominal CT in the ER - both negative.      Formulation on admission: domiciled, never-, partially employed 26yo without psychiatric history who was brought to [facility] ED by his father with odd - potentially psychotic - behavior.     Hospital Course:   1. Admitted to adult inpatient psychiatry for  evaluation, stabilization, and treatment.      2. On admission, start risperidone 0.5mg BID. Order q15min checks for safety, programming, and prn medication for anxiety, agitation, and insomnia     4/9/2021 - with the patient's permission, I met with his family including together with his mother and father, and brother. They confirmed the recent change in his behavior but insisted it was not mental illness; that it was the result of a single dose of antinausea medication he receieved in the ED the night before. They felt that whatever was going on had resolved and requested he be discharged home with them. They did not want him on a psychiatric medication. I explained my opinion he may have a psychotic disorder and encouraged them to have him follow-up with an outpatient mental health professional after discharge to help track his symptoms over time.      When I met with the patient, he confirmed he wanted to be discharged with his family and without medication. I explained my opinion to him as well including the risks of an untreated psychotic disorder. Given he was not an imminent risk to himself or others, he was discharged per his request with information for outpatient follow-up. 3. Internal medicine consult for admission.   Hypokalemia - resolved  - 3.3 on admission; 4.2 on repeat BMP  - no replacement given in ED     AGMA - resolved  - etiology - unclear  - encourage PO fluids and repeat BMP was normal      GERD  - cont Protonix    4. Voluntary admission    Complications: none;  Willis Fabian did not require emergency psychiatric intervention during this admission such as restraint or emergency medication. Vital signs in last 24 hours:  Vitals:    04/09/21 0759   BP: 105/66   Pulse: 63   Resp: 18   Temp: 98.4 °F (36.9 °C)   SpO2: 98%       Mental Status Examination on Discharge:    Appearance: in gown, fair hygiene  Behavior/Attitude toward examiner:  fair eye contact  Speech: soft. Poverty  Mood:  \"ok\"  Affect:  FLAT  Thought processes:  Disorganized? Thought Content: no SI, no HI, paranoid? Perceptions: +AH  Attention: impaired   Abstraction: impaired  Cognition: Average JENNIFER, Alert and oriented to person, place, time, and situation, recall intact  Insight: impaired  Judgment: impaired    Discharge on regular diet, continue activity as tolerated. Condition on Discharge:  Willis Fabian was in stable condition. Willis Fabian did not have suicidal or homicidal thoughts, and was future oriented. Willis Bending did not represent an imminent risk to self or others. Medication List      STOP taking these medications    ondansetron 4 MG disintegrating tablet  Commonly known as: Zofran ODT     pantoprazole 20 MG tablet  Commonly known as: Protonix            Follow-up Plan: The following was given to the patient at discharge:     The crisis number for [facility] is [phone]. You can use this number at any time to access emergency mental health services. Please follow up with your PCP regarding any pending labs. You have shared that you are interested in contact information for mental health follow up services in your area. [facility] is a local agency that can provide these services. Their information is below. Please note that [facility] STRONGLY RECOMMENDS that you have follow up care WITHIN 7 DAYS OF DISCHARGE. [employer] is a provider of mental health and case management services in [facility]. Open enrollment is available. Open enrollment is Monday  Thursday 8:00 AM to 4:00 PM, and Friday from 8:00 AM to 3:00 PM.  Please being a photo ID, insurance card, proof of address, and a list of current medicines. If you have no insurance, please bring income statements and proof of denial of Medicaid for sliding fee services. Name of Provider: [employer]  Provider specialty/license: MABLE/CYNTHIA/MD  Date and time of appointment: Monday 4/12/2021 @ 9:00am <- Suggested call in time  The type/s of services requested are: case management, therapy, and medication management  Agency name: [employer]  Address: [address] , [place], [address] [place] [address]  Phone Number: [phone]  Special instructions (what to bring to appointment, etc.): Please being a photo ID, insurance card, proof of address, current medicines, and copay. **With the current concerns for Coronavirus (COVID-19), please contact your providers prior to going in to their offices. [facility] and agencies have adjusted their practices to reduce spread of the illness. If you have any questions about the virus or recommendations for home care, please call the [facility]) COVID-19 hotline at [phone]. For all emergencies, please contact 911. **    More than 30 minutes were spent with

## 2022-02-19 ENCOUNTER — HOSPITAL ENCOUNTER (EMERGENCY)
Age: 22
Discharge: HOME | End: 2022-02-19
Attending: EMERGENCY MEDICINE

## 2022-02-19 VITALS
RESPIRATION RATE: 16 BRPM | OXYGEN SATURATION: 98 % | TEMPERATURE: 97.6 F | BODY MASS INDEX: 21.22 KG/M2 | WEIGHT: 140 LBS | HEIGHT: 68 IN | SYSTOLIC BLOOD PRESSURE: 126 MMHG | DIASTOLIC BLOOD PRESSURE: 74 MMHG | HEART RATE: 76 BPM

## 2022-02-19 DIAGNOSIS — F20.81 SCHIZOPHRENIFORM DISORDER (HCC): Primary | ICD-10-CM

## 2022-02-19 PROCEDURE — 99282 EMERGENCY DEPT VISIT SF MDM: CPT

## 2022-02-19 NOTE — ED PROVIDER NOTES
[address] ENCOUNTER        Pt Name: Anna Stock  MRN: [id number]  [place] 2000  Date of evaluation: 2/19/2022  Provider: Timothy Ramirez MD  PCP: No primary care provider on file. This patient was seen and evaluated by the attending physician Timothy Ramirez MD.      [address] [place] [address]       Chief Complaint   Patient presents with    Altered Mental Status     Came by [place] [employer]. Was working at [employer] when he was asked to clock out d/t multiple mistakes, he then ran across traffic into woods. Was found by PD and k9 standing in a water. HISTORY OF PRESENT ILLNESS   (Location/Symptom, Timing/Onset, Context/Setting, Quality, Duration, Modifying Factors, Severity)  Note limiting factors. Anna Stock is a 24 y.o. male in by PD after being found standing in the woods. Apparently was working at [employer] when he was asked by his manager to clock out because he was making errors and then ran across traffic into the woods. Apparently then someone called the ambulance and police and so police found him to standing up in a puddle of water. He has a diagnosed history of schizophrenia which was diagnosed about 6 months ago before she is on medications for. Apparently is pretty functional as he maintains a job. Denies any complaints. No SI or HI. Mostly shakes and nods his head and is very quiet. Nursing Notes were all reviewed and agreed with or any disagreements were addressed  in the HPI. REVIEW OF SYSTEMS    (2-9 systems for level 4, 10 or more for level 5)     Review of Systems    Positives and Pertinent negatives as per HPI. Except as noted abovein the ROS, all other systems were reviewed and negative. PAST MEDICAL HISTORY   History reviewed. No pertinent past medical history. SURGICAL HISTORY   History reviewed. No pertinent surgical history.       CURRENTMEDICATIONS       Previous Medications    No medications on file         ALLERGIES     Patient has no known allergies. FAMILYHISTORY     History reviewed. No pertinent family history. SOCIAL HISTORY       Social History     Socioeconomic History    Marital status: Single     Spouse name: None    Number of children: None    Years of education: None    Highest education level: None   Occupational History    None   Tobacco Use    Smoking status: Never Smoker    Smokeless tobacco: Never Used   Vaping Use    Vaping Use: Never used   Substance and Sexual Activity    Alcohol use: Not Currently    Drug use: Never    Sexual activity: Not Currently   Other Topics Concern    None   Social History Narrative    None     Social Determinants of Health     Financial Resource Strain:     Difficulty of Paying Living Expenses: Not on file   Food Insecurity:     Worried About Running Out of Food in the Last Year: Not on file    Libby of Food in the Last Year: Not on file   Transportation Needs:     Lack of Transportation (Medical): Not on file    Lack of Transportation (Non-Medical):  Not on file   Physical Activity:     Days of Exercise per Week: Not on file    Minutes of Exercise per Session: Not on file   Stress:     Feeling of Stress : Not on file   Social Connections:     Frequency of Communication with Friends and Family: Not on file    Frequency of Social Gatherings with Friends and Family: Not on file    Attends Caodaism Services: Not on file    Active Member of Clubs or Organizations: Not on file    Attends Club or Organization Meetings: Not on file    Marital Status: Not on file   Intimate Partner Violence:     Fear of Current or Ex-Partner: Not on file    Emotionally Abused: Not on file    Physically Abused: Not on file    Sexually Abused: Not on file   Housing Stability:     Unable to Pay for Housing in the Last Year: Not on file    Number of [place] in the Last Year: Not on file    Unstable Housing in the Last Year: Not on file       SCREENINGS    Arti Coma Scale  Eye Opening: Spontaneous  Best Verbal Response: Confused  Best Motor Response: Obeys commands  Bethel Coma Scale Score: 14        PHYSICAL EXAM    (up to 7 for level 4, 8 or more for level 5)     ED Triage Vitals   BP Temp Temp src Pulse Resp SpO2 Height Weight   -- -- -- -- -- -- -- --       Physical Exam     General Appearance:  Alert, cooperative, no distress, appears stated age. Head:  Normocephalic, without obvious abnormality, atraumatic. Eyes:  conjunctiva/corneas clear, EOM's intact. Sclera anicteric. ENT: Mucous membranes moist.   Neck: Supple, symmetrical, trachea midline, no adenopathy. No jugular venous distention. Lungs:   No Respiratory Distress. Chest Wall:  Atraumatic   Heart:  RRR   Abdomen:   Soft, NT, ND   Extremities:  Full range of motion. Pulses: Symmetric x4   Skin:  No rashes or lesions to exposed skin. Neurologic: Alert and oriented X 3. Motor grossly normal.  Speech clear. DIAGNOSTIC RESULTS   LABS:    Labs Reviewed - No data to display    All other labs were within normal range or not returned as of this dictation. EKG: All EKG's are interpreted by the Emergency Department Physician in the absence of a cardiologist.  Please see their note for interpretation of EKG. RADIOLOGY:   Non-plain film images such as CT, Ultrasound and MRI are read by the radiologist. Plain radiographic images are visualized andpreliminarily interpreted by the  ED Provider with the below findings:        Interpretation [facility] Radiologist below, if available at the time of this note:    No orders to display     No results found.         PROCEDURES   Unless otherwise noted below, none     Procedures    CRITICAL CARE TIME   N/A    CONSULTS:  None      EMERGENCY DEPARTMENT COURSE and DIFFERENTIAL DIAGNOSIS/MDM:   Vitals:    Vitals:    02/19/22 1707 02/19/22 1719   BP:  126/74   Pulse: 92 76   Resp: 16    Temp: 97.6 °F (36.4 °C) TempSrc: Oral    SpO2:  98%   Weight: 140 lb (63.5 kg)    Height: 5' 8\" (1.727 m)        Patient was given thefollowing medications:  Medications - No data to display    26-year-old male with behavioral issues today. Has no emergent medical condition at present. I will contact this family have him come pick him up. Unless I get a different history from them I do not think he needs to be involuntarily committed. He'd benefit from an antipsychotic, but family and patient have refused that in the past from what I can glean. Mom Is here and is able to get some corroborative history. This is his baseline and she thinks is his usual state of health. No EMC. DC home. FINAL IMPRESSION      1. Schizophreniform disorder ([facility]          DISPOSITION/PLAN   DISPOSITION        PATIENT REFERREDTO:  No follow-up provider specified.     DISCHARGE MEDICATIONS:  New Prescriptions    No medications on file       DISCONTINUED MEDICATIONS:  Discontinued Medications    No medications on file              (Please note that portions ofthis note were completed with a voice recognition program.  Efforts were made to edit the dictations but occasionally words are mis-transcribed.)    Ang Montesinos MD (electronically signed)           Ang Montesinos MD  02/19/22 8612

## 2022-02-19 NOTE — ED NOTES
Bed: 09  Expected date:   Expected time:   Means of arrival:   Comments:  Kalyn Pinzon RN  02/19/22 7004

## 2022-02-20 ENCOUNTER — HOSPITAL ENCOUNTER (EMERGENCY)
Age: 22
Discharge: PSYCHIATRIC OR SUBSTANCE USE FACILITY | End: 2022-02-21
Attending: STUDENT IN AN ORGANIZED HEALTH CARE EDUCATION/TRAINING PROGRAM

## 2022-02-20 ENCOUNTER — APPOINTMENT (OUTPATIENT)
Dept: CT IMAGING | Age: 22
End: 2022-02-20

## 2022-02-20 VITALS
DIASTOLIC BLOOD PRESSURE: 76 MMHG | HEART RATE: 88 BPM | OXYGEN SATURATION: 98 % | RESPIRATION RATE: 18 BRPM | TEMPERATURE: 97.8 F | SYSTOLIC BLOOD PRESSURE: 118 MMHG

## 2022-02-20 DIAGNOSIS — F29 PSYCHOSIS, UNSPECIFIED PSYCHOSIS TYPE (HCC): Primary | ICD-10-CM

## 2022-02-20 LAB
A/G RATIO: 1.3 (ref 1.1–2.2)
ACETAMINOPHEN LEVEL: <5 UG/ML (ref 10–30)
ALBUMIN SERPL-MCNC: 4.4 G/DL (ref 3.4–5)
ALP BLD-CCNC: 96 U/L (ref 40–129)
ALT SERPL-CCNC: 12 U/L (ref 10–40)
AMPHETAMINE SCREEN, URINE: NORMAL
ANION GAP SERPL CALCULATED.3IONS-SCNC: 12 MMOL/L (ref 3–16)
AST SERPL-CCNC: 18 U/L (ref 15–37)
BARBITURATE SCREEN URINE: NORMAL
BASOPHILS ABSOLUTE: 0 K/UL (ref 0–0.2)
BASOPHILS RELATIVE PERCENT: 0.4 %
BENZODIAZEPINE SCREEN, URINE: NORMAL
BILIRUB SERPL-MCNC: 0.9 MG/DL (ref 0–1)
BILIRUBIN URINE: NEGATIVE
BLOOD, URINE: NEGATIVE
BUN BLDV-MCNC: 8 MG/DL (ref 7–20)
CALCIUM SERPL-MCNC: 9.3 MG/DL (ref 8.3–10.6)
CANNABINOID SCREEN URINE: NORMAL
CHLORIDE BLD-SCNC: 104 MMOL/L (ref 99–110)
CLARITY: CLEAR
CO2: 23 MMOL/L (ref 21–32)
COCAINE METABOLITE SCREEN URINE: NORMAL
COLOR: YELLOW
CREAT SERPL-MCNC: 1 MG/DL (ref 0.9–1.3)
EOSINOPHILS ABSOLUTE: 0 K/UL (ref 0–0.6)
EOSINOPHILS RELATIVE PERCENT: 0 %
ETHANOL: NORMAL MG/DL (ref 0–0.08)
GFR AFRICAN AMERICAN: >60
GFR NON-AFRICAN AMERICAN: >60
GLUCOSE BLD-MCNC: 132 MG/DL (ref 70–99)
GLUCOSE URINE: 100 MG/DL
HCT VFR BLD CALC: 44.2 % (ref 40.5–52.5)
HEMOGLOBIN: 14.6 G/DL (ref 13.5–17.5)
KETONES, URINE: ABNORMAL MG/DL
LEUKOCYTE ESTERASE, URINE: NEGATIVE
LYMPHOCYTES ABSOLUTE: 0.5 K/UL (ref 1–5.1)
LYMPHOCYTES RELATIVE PERCENT: 6.5 %
Lab: NORMAL
MCH RBC QN AUTO: 29.6 PG (ref 26–34)
MCHC RBC AUTO-ENTMCNC: 33.1 G/DL (ref 31–36)
MCV RBC AUTO: 89.2 FL (ref 80–100)
METHADONE SCREEN, URINE: NORMAL
MICROSCOPIC EXAMINATION: ABNORMAL
MONOCYTES ABSOLUTE: 0.5 K/UL (ref 0–1.3)
MONOCYTES RELATIVE PERCENT: 6.3 %
NEUTROPHILS ABSOLUTE: 6.9 K/UL (ref 1.7–7.7)
NEUTROPHILS RELATIVE PERCENT: 86.8 %
NITRITE, URINE: NEGATIVE
OPIATE SCREEN URINE: NORMAL
OXYCODONE URINE: NORMAL
PDW BLD-RTO: 13 % (ref 12.4–15.4)
PH UA: 6
PH UA: 6 (ref 5–8)
PHENCYCLIDINE SCREEN URINE: NORMAL
PLATELET # BLD: 179 K/UL (ref 135–450)
PMV BLD AUTO: 8.1 FL (ref 5–10.5)
POTASSIUM REFLEX MAGNESIUM: 3.8 MMOL/L (ref 3.5–5.1)
PROPOXYPHENE SCREEN: NORMAL
PROTEIN UA: NEGATIVE MG/DL
RBC # BLD: 4.95 M/UL (ref 4.2–5.9)
SALICYLATE, SERUM: <0.3 MG/DL (ref 15–30)
SARS-COV-2, NAAT: NOT DETECTED
SODIUM BLD-SCNC: 139 MMOL/L (ref 136–145)
SPECIFIC GRAVITY UA: 1.02 (ref 1–1.03)
TOTAL PROTEIN: 7.7 G/DL (ref 6.4–8.2)
URINE TYPE: ABNORMAL
UROBILINOGEN, URINE: 1 E.U./DL
WBC # BLD: 7.9 K/UL (ref 4–11)

## 2022-02-20 PROCEDURE — 87635 SARS-COV-2 COVID-19 AMP PRB: CPT

## 2022-02-20 PROCEDURE — 80143 DRUG ASSAY ACETAMINOPHEN: CPT

## 2022-02-20 PROCEDURE — 80307 DRUG TEST PRSMV CHEM ANLYZR: CPT

## 2022-02-20 PROCEDURE — 82077 ASSAY SPEC XCP UR&BREATH IA: CPT

## 2022-02-20 PROCEDURE — 80179 DRUG ASSAY SALICYLATE: CPT

## 2022-02-20 PROCEDURE — 70450 CT HEAD/BRAIN W/O DYE: CPT

## 2022-02-20 PROCEDURE — 80053 COMPREHEN METABOLIC PANEL: CPT

## 2022-02-20 PROCEDURE — 6370000000 HC RX 637 (ALT 250 FOR IP): Performed by: STUDENT IN AN ORGANIZED HEALTH CARE EDUCATION/TRAINING PROGRAM

## 2022-02-20 PROCEDURE — 85025 COMPLETE CBC W/AUTO DIFF WBC: CPT

## 2022-02-20 PROCEDURE — 81003 URINALYSIS AUTO W/O SCOPE: CPT

## 2022-02-20 PROCEDURE — 99284 EMERGENCY DEPT VISIT MOD MDM: CPT

## 2022-02-20 RX ORDER — OLANZAPINE 10 MG/1
10 TABLET, ORALLY DISINTEGRATING ORAL ONCE
Status: COMPLETED | OUTPATIENT
Start: 2022-02-20 | End: 2022-02-20

## 2022-02-20 RX ADMIN — OLANZAPINE 10 MG: 10 TABLET, ORALLY DISINTEGRATING ORAL at 18:57

## 2022-02-20 NOTE — ED NOTES
Bed: 07  Expected date:   Expected time:   Means of arrival:   Comments:  Yuridia Jones RN  02/20/22 2460

## 2022-02-20 NOTE — ED PROVIDER NOTES
Primary Care Physician: No primary care provider on file. Attending Physician: Tung Rodriguez MD     History   Chief Complaint   Patient presents with    Altered Mental Status     here yesterday because police found him in bushes. counselor here with him now. he says he is [employer] and was banging head on wall and windows. he is now saying in his native language Twi to counselor        HPI   Robe Mello  is a 24 y.o. male of schizophreniform diagnosed 6 months ago who was seen here yesterday when he presented brought by the police because of erratic behavior. Patient was evaluated and discharged home to family. Brought back today accompanied by  because patient continues to to act abnormal.  Per report he has been hallucinating talking to himself and believe that he has been seeing things. He is also been hitting his head on the wall per reports. Patient would not communicate and would not answer any questions which is a similar presentation yesterday. No past medical history on file. No past surgical history on file. No family history on file.      Social History     Socioeconomic History    Marital status: Single     Spouse name: Not on file    Number of children: Not on file    Years of education: Not on file    Highest education level: Not on file   Occupational History    Not on file   Tobacco Use    Smoking status: Never Smoker    Smokeless tobacco: Never Used   Vaping Use    Vaping Use: Never used   Substance and Sexual Activity    Alcohol use: Not Currently    Drug use: Never    Sexual activity: Not Currently   Other Topics Concern    Not on file   Social History Narrative    Not on file     Social Determinants of Health     Financial Resource Strain:     Difficulty of Paying Living Expenses: Not on file   Food Insecurity:     Worried About Running Out of Food in the Last Year: Not on file    Libby of Food in the Last Year: Not on file Transportation Needs:     Lack of Transportation (Medical): Not on file    Lack of Transportation (Non-Medical): Not on file   Physical Activity:     Days of Exercise per Week: Not on file    Minutes of Exercise per Session: Not on file   Stress:     Feeling of Stress : Not on file   Social Connections:     Frequency of Communication with Friends and Family: Not on file    Frequency of Social Gatherings with Friends and Family: Not on file    Attends Restorationism Services: Not on file    Active Member of [address] [place] [address] or Organizations: Not on file    Attends Club or Organization Meetings: Not on file    Marital Status: Not on file   Intimate Partner Violence:     Fear of Current or Ex-Partner: Not on file    Emotionally Abused: Not on file    Physically Abused: Not on file    Sexually Abused: Not on file   Housing Stability:     Unable to Pay for Housing in the Last Year: Not on file    Number of [place] in the Last Year: Not on file    Unstable Housing in the Last Year: Not on file        Review of Systems   10 total systems reviewed and found to be negative unless otherwise noted in HPI     Physical Exam   /76   Pulse 88   Temp 97.8 °F (36.6 °C) (Oral)   Resp 18   SpO2 98%      CONSTITUTIONAL: Well appearing, in no acute distress   HEAD: atraumatic, normocephalic   EYES: PERRL, No injection, discharge or scleral icterus. ENT: Moist mucous membranes. NECK: Normal ROM, NO LAD   CARDIOVASCULAR: Regular rate and rhythm. No murmurs or gallop. PULMONARY/CHEST: Airway patent. No retractions. Breath sounds clear with good air entry bilaterally. ABDOMEN: Soft, Non-distended and non-tender, without guarding or rebound. SKIN: Acyanotic, warm, dry   MUSCULOSKELETAL: No swelling, tenderness or deformity   NEUROLOGICAL: Awake and oriented x 3. Pulses intact. Grossly nonfocal   Nursing note and vitals reviewed.      ED Course & Medical Decision Making   Medications   OLANZapine zydis (Arnold Ambrose) disintegrating tablet 10 mg (10 mg Oral Given 2/20/22 4703)      Labs Reviewed   CBC WITH AUTO DIFFERENTIAL - Abnormal; Notable for the following components:       Result Value    Lymphocytes Absolute 0.5 (*)     All other components within normal limits    Narrative:     Performed at:  [facility]  [address], [address]   Phone [phone]   COMPREHENSIVE METABOLIC PANEL W/ REFLEX TO MG FOR LOW K - Abnormal; Notable for the following components:    Glucose 132 (*)     All other components within normal limits    Narrative:     Performed at:  [facility] [address], [place]   Phone [phone]   ACETAMINOPHEN LEVEL - Abnormal; Notable for the following components:    Acetaminophen Level <5 (*)     All other components within normal limits    Narrative:     Performed at:  [facility] [address], [place]   Phone [phone]   SALICYLATE LEVEL - Abnormal; Notable for the following components:    Salicylate, Serum <6.1 (*)     All other components within normal limits    Narrative:     Performed at:  [facility] [address], [place]   Phone [phone], RAPID    Narrative:     Performed at:  [facility] [address], [place]   Phone [phone]   ETHANOL    Narrative:     Performed at:  [facility] [address], [place]   Phone [phone]   URINE DRUG SCREEN    Narrative:     Performed at:  [facility] [address], [place]   Phone [phone]   URINALYSIS    Narrative:     Performed at:  [facility] [address], [place]   Phone [phone]      CT Head WO Contrast   Final Result   No acute intracranial abnormality. PROCEDURES:   Procedures    ASSESSMENT AND PLAN:  [id number], Petra Dobbs is a 24 y.o. male presents with erratic behavior. Patient is hallucinating both visual and auditory. Was seen here yesterday and discharged home to family. Was brought by the police. On exam he does have a flat affect and is talking to himself. He is not answering any questions and not involving any conversation. Given his presentation I was concerned for organic versus psychogenic causes. Labs obtained including CT scan. Results unremarkable ruling out ruling out organic causes. At this time I believe that his symptoms are psychogenic most likely psychosis of unknown cause. I believe patient will benefit from inpatient admission with psych. He has been medically cleared and currently pending to be evaluated by psych. ClINICAL IMPRESSION:  1. Psychosis, unspecified psychosis type [facility])          DISPOSITION    Pending psych admit  ___________________________________________________________________________________  _________________________________________________________________________________________  This record is transcribed utilizing voice recognition technology. There are inherent limitations in this technology. In addition, there may be limitations in editing of this report. If there are any discrepancies, please contact me directly.         Shahla Camacho MD  02/20/22 2006

## 2022-02-21 NOTE — ED NOTES
Report called to [facility] at this time to Vniod Miguel, [address] R[address] [facility]  02/21/22 0000

## 2024-06-26 ENCOUNTER — HOSPITAL ENCOUNTER (EMERGENCY)
Age: 24
Discharge: HOME | End: 2024-06-26
Attending: EMERGENCY MEDICINE

## 2024-06-26 VITALS
OXYGEN SATURATION: 96 % | SYSTOLIC BLOOD PRESSURE: 124 MMHG | HEIGHT: 66 IN | WEIGHT: 137.7 LBS | BODY MASS INDEX: 22.13 KG/M2 | RESPIRATION RATE: 16 BRPM | HEART RATE: 86 BPM | DIASTOLIC BLOOD PRESSURE: 72 MMHG | TEMPERATURE: 98.5 F

## 2024-06-26 DIAGNOSIS — R00.2 PALPITATIONS: Primary | ICD-10-CM

## 2024-06-26 LAB
ALBUMIN SERPL-MCNC: 4.4 G/DL (ref 3.4–5)
ALBUMIN/GLOB SERPL: 1.2 {RATIO} (ref 1.1–2.2)
ALP SERPL-CCNC: 106 U/L (ref 40–129)
ALT SERPL-CCNC: 13 U/L (ref 10–40)
ANION GAP SERPL CALCULATED.3IONS-SCNC: 12 MMOL/L (ref 3–16)
AST SERPL-CCNC: 16 U/L (ref 15–37)
BASOPHILS # BLD: 0 K/UL (ref 0–0.2)
BASOPHILS NFR BLD: 0.9 %
BILIRUB SERPL-MCNC: 1 MG/DL (ref 0–1)
BUN SERPL-MCNC: 6 MG/DL (ref 7–20)
CALCIUM SERPL-MCNC: 9.8 MG/DL (ref 8.3–10.6)
CHLORIDE SERPL-SCNC: 104 MMOL/L (ref 99–110)
CO2 SERPL-SCNC: 22 MMOL/L (ref 21–32)
CREAT SERPL-MCNC: 0.8 MG/DL (ref 0.9–1.3)
DEPRECATED RDW RBC AUTO: 13.3 % (ref 12.4–15.4)
EOSINOPHIL # BLD: 0 K/UL (ref 0–0.6)
EOSINOPHIL NFR BLD: 0.4 %
GFR SERPLBLD CREATININE-BSD FMLA CKD-EPI: >90 ML/MIN/{1.73_M2}
GLUCOSE SERPL-MCNC: 87 MG/DL (ref 70–99)
HCT VFR BLD AUTO: 47.1 % (ref 40.5–52.5)
HGB BLD-MCNC: 15.4 G/DL (ref 13.5–17.5)
LYMPHOCYTES # BLD: 1.3 K/UL (ref 1–5.1)
LYMPHOCYTES NFR BLD: 33.6 %
MCH RBC QN AUTO: 29.9 PG (ref 26–34)
MCHC RBC AUTO-ENTMCNC: 32.8 G/DL (ref 31–36)
MCV RBC AUTO: 91.2 FL (ref 80–100)
MONOCYTES # BLD: 0.2 K/UL (ref 0–1.3)
MONOCYTES NFR BLD: 5.4 %
NEUTROPHILS # BLD: 2.4 K/UL (ref 1.7–7.7)
NEUTROPHILS NFR BLD: 59.7 %
PLATELET # BLD AUTO: 191 K/UL (ref 135–450)
PMV BLD AUTO: 8.1 FL (ref 5–10.5)
POTASSIUM SERPL-SCNC: 4.2 MMOL/L (ref 3.5–5.1)
PROT SERPL-MCNC: 8.2 G/DL (ref 6.4–8.2)
RBC # BLD AUTO: 5.16 M/UL (ref 4.2–5.9)
SODIUM SERPL-SCNC: 138 MMOL/L (ref 136–145)
WBC # BLD AUTO: 4 K/UL (ref 4–11)

## 2024-06-26 PROCEDURE — 80053 COMPREHEN METABOLIC PANEL: CPT

## 2024-06-26 PROCEDURE — 99284 EMERGENCY DEPT VISIT MOD MDM: CPT

## 2024-06-26 PROCEDURE — 93005 ELECTROCARDIOGRAM TRACING: CPT | Performed by: EMERGENCY MEDICINE

## 2024-06-26 PROCEDURE — 85025 COMPLETE CBC W/AUTO DIFF WBC: CPT

## 2024-06-26 RX ORDER — RISPERIDONE 1 MG/1
TABLET ORAL 2 TIMES DAILY
COMMUNITY

## 2024-06-26 ASSESSMENT — PAIN - FUNCTIONAL ASSESSMENT: PAIN_FUNCTIONAL_ASSESSMENT: NONE - DENIES PAIN

## 2024-06-26 NOTE — ED PROVIDER NOTES
Plain radiographic images are visualized and preliminarily interpreted by the emergency physician with the below findings:    See below    Interpretation per the Radiologist below, if available at the time ofthis note:    All incidental findings were discussed with the patient.    No orders to display         ED BEDSIDE ULTRASOUND:   Performed by ED Physician - none    LABS:  Labs Reviewed   COMPREHENSIVE METABOLIC PANEL W/ REFLEX TO MG FOR LOW K - Abnormal; Notable for the following components:       Result Value    BUN 6 (*)     Creatinine 0.8 (*)     All other components within normal limits   CBC WITH AUTO DIFFERENTIAL       All other labs were within normal range or not returned as of this dictation.    EMERGENCY DEPARTMENT COURSE and DIFFERENTIAL DIAGNOSIS/MDM:   Vitals:    Vitals:    06/26/24 1214   BP: 124/72   Pulse: 86   Resp: 16   Temp: 98.5 °F (36.9 °C)   TempSrc: Oral   SpO2: 96%   Weight: 62.5 kg (137 lb 11.2 oz)   Height: 1.676 m (5' 6\")           MDM      The patient has remained stable throughout his hospital course.  An EKG shows a normal sinus rhythm.  A chemistry profile was unremarkable and a CBC was normal.  I see no evidence of any emergent condition.  The patient already has an appointment to see his primary care physician tomorrow.  I told him to follow-up as directed and to talk about a Holter monitor.  He is return for any other emergencies or worsening of symptoms.    Is this patient to be included in the SEP-1 Core Measure due to severe sepsis or septic shock?   No   Exclusion criteria - the patient is NOT to be included for SEP-1 Core Measure due to:  Infection is not suspected      REASSESSMENT              CONSULTS:  None    PROCEDURES:  Unless otherwise noted below, none     Procedures    FINAL IMPRESSION      1. Palpitations          DISPOSITION/PLAN   DISPOSITION Decision To Discharge 06/26/2024 01:20:56 PM      PATIENT REFERREDTO:  Your primary care physician tomorrow as

## 2024-06-26 NOTE — DISCHARGE INSTRUCTIONS
See your primary care physician tomorrow as scheduled for recheck.  Talk about a Holter monitor as well.  Always return if worse.

## 2024-06-27 LAB
EKG ATRIAL RATE: 89 BPM
EKG DIAGNOSIS: NORMAL
EKG P AXIS: 63 DEGREES
EKG P-R INTERVAL: 166 MS
EKG Q-T INTERVAL: 320 MS
EKG QRS DURATION: 74 MS
EKG QTC CALCULATION (BAZETT): 389 MS
EKG R AXIS: 52 DEGREES
EKG T AXIS: 28 DEGREES
EKG VENTRICULAR RATE: 89 BPM

## 2024-06-27 PROCEDURE — 93010 ELECTROCARDIOGRAM REPORT: CPT | Performed by: INTERNAL MEDICINE

## 2024-08-14 ENCOUNTER — APPOINTMENT (OUTPATIENT)
Dept: GENERAL RADIOLOGY | Age: 24
End: 2024-08-14

## 2024-08-14 ENCOUNTER — HOSPITAL ENCOUNTER (EMERGENCY)
Age: 24
Discharge: HOME | End: 2024-08-14

## 2024-08-14 VITALS
WEIGHT: 136.9 LBS | SYSTOLIC BLOOD PRESSURE: 122 MMHG | OXYGEN SATURATION: 100 % | DIASTOLIC BLOOD PRESSURE: 71 MMHG | TEMPERATURE: 98.1 F | RESPIRATION RATE: 14 BRPM | BODY MASS INDEX: 22.1 KG/M2 | HEART RATE: 78 BPM

## 2024-08-14 DIAGNOSIS — R00.2 PALPITATIONS: Primary | ICD-10-CM

## 2024-08-14 LAB
ALBUMIN SERPL-MCNC: 4.2 G/DL (ref 3.4–5)
ALBUMIN/GLOB SERPL: 1.2 {RATIO} (ref 1.1–2.2)
ALP SERPL-CCNC: 117 U/L (ref 40–129)
ALT SERPL-CCNC: 19 U/L (ref 10–40)
ANION GAP SERPL CALCULATED.3IONS-SCNC: 10 MMOL/L (ref 3–16)
AST SERPL-CCNC: 19 U/L (ref 15–37)
BASOPHILS # BLD: 0 K/UL (ref 0–0.2)
BASOPHILS NFR BLD: 1.1 %
BILIRUB SERPL-MCNC: 0.5 MG/DL (ref 0–1)
BUN SERPL-MCNC: 6 MG/DL (ref 7–20)
CALCIUM SERPL-MCNC: 9 MG/DL (ref 8.3–10.6)
CHLORIDE SERPL-SCNC: 102 MMOL/L (ref 99–110)
CO2 SERPL-SCNC: 25 MMOL/L (ref 21–32)
CREAT SERPL-MCNC: 0.9 MG/DL (ref 0.9–1.3)
DEPRECATED RDW RBC AUTO: 13.2 % (ref 12.4–15.4)
EOSINOPHIL # BLD: 0 K/UL (ref 0–0.6)
EOSINOPHIL NFR BLD: 0.6 %
GFR SERPLBLD CREATININE-BSD FMLA CKD-EPI: >90 ML/MIN/{1.73_M2}
GLUCOSE SERPL-MCNC: 91 MG/DL (ref 70–99)
HCT VFR BLD AUTO: 42.2 % (ref 40.5–52.5)
HGB BLD-MCNC: 14.1 G/DL (ref 13.5–17.5)
LYMPHOCYTES # BLD: 1.2 K/UL (ref 1–5.1)
LYMPHOCYTES NFR BLD: 26.7 %
MAGNESIUM SERPL-MCNC: 1.9 MG/DL (ref 1.8–2.4)
MCH RBC QN AUTO: 30.3 PG (ref 26–34)
MCHC RBC AUTO-ENTMCNC: 33.5 G/DL (ref 31–36)
MCV RBC AUTO: 90.6 FL (ref 80–100)
MONOCYTES # BLD: 0.3 K/UL (ref 0–1.3)
MONOCYTES NFR BLD: 6.6 %
NEUTROPHILS # BLD: 2.9 K/UL (ref 1.7–7.7)
NEUTROPHILS NFR BLD: 65 %
PLATELET # BLD AUTO: 164 K/UL (ref 135–450)
PMV BLD AUTO: 8.3 FL (ref 5–10.5)
POTASSIUM SERPL-SCNC: 4.1 MMOL/L (ref 3.5–5.1)
PROT SERPL-MCNC: 7.7 G/DL (ref 6.4–8.2)
RBC # BLD AUTO: 4.66 M/UL (ref 4.2–5.9)
SODIUM SERPL-SCNC: 137 MMOL/L (ref 136–145)
TROPONIN, HIGH SENSITIVITY: <6 NG/L (ref 0–22)
WBC # BLD AUTO: 4.4 K/UL (ref 4–11)

## 2024-08-14 PROCEDURE — 80053 COMPREHEN METABOLIC PANEL: CPT

## 2024-08-14 PROCEDURE — 84484 ASSAY OF TROPONIN QUANT: CPT

## 2024-08-14 PROCEDURE — 85025 COMPLETE CBC W/AUTO DIFF WBC: CPT

## 2024-08-14 PROCEDURE — 84443 ASSAY THYROID STIM HORMONE: CPT

## 2024-08-14 PROCEDURE — 99285 EMERGENCY DEPT VISIT HI MDM: CPT

## 2024-08-14 PROCEDURE — 93005 ELECTROCARDIOGRAM TRACING: CPT | Performed by: PHYSICIAN ASSISTANT

## 2024-08-14 PROCEDURE — 83735 ASSAY OF MAGNESIUM: CPT

## 2024-08-14 PROCEDURE — 71045 X-RAY EXAM CHEST 1 VIEW: CPT

## 2024-08-14 ASSESSMENT — ENCOUNTER SYMPTOMS
NAUSEA: 0
ABDOMINAL PAIN: 0
DIARRHEA: 0
CONSTIPATION: 0
COLOR CHANGE: 0
CHEST TIGHTNESS: 0
SHORTNESS OF BREATH: 0
VOMITING: 0
PHOTOPHOBIA: 0
RESPIRATORY NEGATIVE: 1
COUGH: 0
BACK PAIN: 0

## 2024-08-15 LAB
EKG ATRIAL RATE: 84 BPM
EKG DIAGNOSIS: NORMAL
EKG P AXIS: 60 DEGREES
EKG P-R INTERVAL: 184 MS
EKG Q-T INTERVAL: 318 MS
EKG QRS DURATION: 82 MS
EKG QTC CALCULATION (BAZETT): 375 MS
EKG R AXIS: 39 DEGREES
EKG T AXIS: 33 DEGREES
EKG VENTRICULAR RATE: 84 BPM
TSH SERPL DL<=0.005 MIU/L-ACNC: 1.46 UIU/ML (ref 0.27–4.2)

## 2024-08-15 PROCEDURE — 93010 ELECTROCARDIOGRAM REPORT: CPT | Performed by: INTERNAL MEDICINE

## 2024-08-15 NOTE — ED PROVIDER NOTES
EKG:  Read by me in the absence of a cardiologist shows: Sinus rhythm, normal rate, normal conduction intervals, normal axis, no acute injury pattern, no major change from prior study      I did not perform face-to-face evaluation and was not otherwise involved in this patient's care.  Please see PA/NP note for further information on this visit.        Kelly Rao MD  08/14/24 4311    
Conjunctivae normal.   Neck:      Comments: No thyromegaly.   Cardiovascular:      Rate and Rhythm: Normal rate and regular rhythm.      Pulses: Normal pulses.      Heart sounds: Normal heart sounds. No murmur heard.     No friction rub. No gallop.      Comments: 2+ radial pulses bilaterally.  No pulse deficit.  No JVD.  No calf tenderness.  No pedal edema  Pulmonary:      Effort: Pulmonary effort is normal. No respiratory distress.      Breath sounds: Normal breath sounds. No stridor. No wheezing, rhonchi or rales.   Chest:      Chest wall: No tenderness.   Abdominal:      General: Abdomen is flat. Bowel sounds are normal. There is no distension.      Palpations: Abdomen is soft. There is no mass.      Tenderness: There is no abdominal tenderness. There is no right CVA tenderness, left CVA tenderness, guarding or rebound.      Hernia: No hernia is present.   Musculoskeletal:         General: Normal range of motion.      Cervical back: Normal range of motion and neck supple. No rigidity or tenderness.   Lymphadenopathy:      Cervical: No cervical adenopathy.   Skin:     General: Skin is warm and dry.      Coloration: Skin is not pale.      Findings: No erythema or rash.   Neurological:      Mental Status: He is alert and oriented to person, place, and time.   Psychiatric:         Behavior: Behavior normal.         DIAGNOSTIC RESULTS   LABS:    Labs Reviewed   COMPREHENSIVE METABOLIC PANEL - Abnormal; Notable for the following components:       Result Value    BUN 6 (*)     All other components within normal limits   CBC WITH AUTO DIFFERENTIAL   MAGNESIUM   TROPONIN   TSH WITH REFLEX       When ordered only abnormal lab results are displayed. All other labs were within normal range or not returned as of this dictation.    EKG: When ordered, EKG's are interpreted by the Emergency Department Physician in the absence of a cardiologist.  Please see their note for interpretation of EKG.    RADIOLOGY:   Non-plain film